# Patient Record
Sex: FEMALE | Race: WHITE | NOT HISPANIC OR LATINO | Employment: FULL TIME | URBAN - METROPOLITAN AREA
[De-identification: names, ages, dates, MRNs, and addresses within clinical notes are randomized per-mention and may not be internally consistent; named-entity substitution may affect disease eponyms.]

---

## 2023-05-07 ENCOUNTER — HOSPITAL ENCOUNTER (EMERGENCY)
Facility: HOSPITAL | Age: 48
Discharge: HOME | End: 2023-05-07
Attending: EMERGENCY MEDICINE | Admitting: EMERGENCY MEDICINE

## 2023-05-07 ENCOUNTER — APPOINTMENT (EMERGENCY)
Dept: RADIOLOGY | Facility: HOSPITAL | Age: 48
End: 2023-05-07

## 2023-05-07 VITALS
RESPIRATION RATE: 21 BRPM | OXYGEN SATURATION: 90 % | TEMPERATURE: 97.2 F | WEIGHT: 293 LBS | SYSTOLIC BLOOD PRESSURE: 137 MMHG | HEIGHT: 66 IN | DIASTOLIC BLOOD PRESSURE: 72 MMHG | HEART RATE: 100 BPM | BODY MASS INDEX: 47.09 KG/M2

## 2023-05-07 DIAGNOSIS — J40 BRONCHITIS: Primary | ICD-10-CM

## 2023-05-07 LAB
ANION GAP SERPL CALCULATED.3IONS-SCNC: 8 MMOL/L (ref 4–13)
ATRIAL RATE: 93 BPM
BASOPHILS # BLD AUTO: 0.05 THOUSANDS/ÂΜL (ref 0–0.1)
BASOPHILS NFR BLD AUTO: 1 % (ref 0–1)
BUN SERPL-MCNC: 8 MG/DL (ref 5–25)
CALCIUM SERPL-MCNC: 9.4 MG/DL (ref 8.4–10.2)
CARDIAC TROPONIN I PNL SERPL HS: 5 NG/L
CHLORIDE SERPL-SCNC: 103 MMOL/L (ref 96–108)
CO2 SERPL-SCNC: 24 MMOL/L (ref 21–32)
CREAT SERPL-MCNC: 0.8 MG/DL (ref 0.6–1.3)
EOSINOPHIL # BLD AUTO: 0.08 THOUSAND/ÂΜL (ref 0–0.61)
EOSINOPHIL NFR BLD AUTO: 1 % (ref 0–6)
ERYTHROCYTE [DISTWIDTH] IN BLOOD BY AUTOMATED COUNT: 15.7 % (ref 11.6–15.1)
GFR SERPL CREATININE-BSD FRML MDRD: 107 ML/MIN/1.73SQ M
GLUCOSE SERPL-MCNC: 95 MG/DL (ref 65–140)
HCT VFR BLD AUTO: 44.1 % (ref 36.5–49.3)
HGB BLD-MCNC: 14.6 G/DL (ref 12–17)
IMM GRANULOCYTES # BLD AUTO: 0.03 THOUSAND/UL (ref 0–0.2)
IMM GRANULOCYTES NFR BLD AUTO: 0 % (ref 0–2)
LYMPHOCYTES # BLD AUTO: 1 THOUSANDS/ÂΜL (ref 0.6–4.47)
LYMPHOCYTES NFR BLD AUTO: 13 % (ref 14–44)
MCH RBC QN AUTO: 29 PG (ref 26.8–34.3)
MCHC RBC AUTO-ENTMCNC: 33.1 G/DL (ref 31.4–37.4)
MCV RBC AUTO: 88 FL (ref 82–98)
MONOCYTES # BLD AUTO: 0.76 THOUSAND/ÂΜL (ref 0.17–1.22)
MONOCYTES NFR BLD AUTO: 10 % (ref 4–12)
NEUTROPHILS # BLD AUTO: 5.81 THOUSANDS/ÂΜL (ref 1.85–7.62)
NEUTS SEG NFR BLD AUTO: 75 % (ref 43–75)
NRBC BLD AUTO-RTO: 0 /100 WBCS
P AXIS: 69 DEGREES
PLATELET # BLD AUTO: 345 THOUSANDS/UL (ref 149–390)
PMV BLD AUTO: 8.9 FL (ref 8.9–12.7)
POTASSIUM SERPL-SCNC: 3.8 MMOL/L (ref 3.5–5.3)
PR INTERVAL: 140 MS
QRS AXIS: 59 DEGREES
QRSD INTERVAL: 76 MS
QT INTERVAL: 348 MS
QTC INTERVAL: 432 MS
RBC # BLD AUTO: 5.03 MILLION/UL (ref 3.88–5.62)
SODIUM SERPL-SCNC: 135 MMOL/L (ref 135–147)
T WAVE AXIS: 36 DEGREES
VENTRICULAR RATE: 93 BPM
WBC # BLD AUTO: 7.73 THOUSAND/UL (ref 4.31–10.16)

## 2023-05-07 RX ORDER — IPRATROPIUM BROMIDE AND ALBUTEROL SULFATE 2.5; .5 MG/3ML; MG/3ML
3 SOLUTION RESPIRATORY (INHALATION) 4 TIMES DAILY
Qty: 60 ML | Refills: 1 | Status: SHIPPED | OUTPATIENT
Start: 2023-05-07

## 2023-05-07 RX ORDER — AZITHROMYCIN 250 MG/1
TABLET, FILM COATED ORAL
Qty: 6 TABLET | Refills: 0 | Status: SHIPPED | OUTPATIENT
Start: 2023-05-07 | End: 2023-05-13

## 2023-05-07 RX ORDER — PREDNISONE 50 MG/1
50 TABLET ORAL DAILY
Qty: 5 TABLET | Refills: 0 | Status: SHIPPED | OUTPATIENT
Start: 2023-05-07 | End: 2023-05-13

## 2023-05-07 RX ORDER — METHYLPREDNISOLONE SODIUM SUCCINATE 125 MG/2ML
125 INJECTION, POWDER, LYOPHILIZED, FOR SOLUTION INTRAMUSCULAR; INTRAVENOUS ONCE
Status: COMPLETED | OUTPATIENT
Start: 2023-05-07 | End: 2023-05-07

## 2023-05-07 RX ORDER — SODIUM CHLORIDE FOR INHALATION 0.9 %
12 VIAL, NEBULIZER (ML) INHALATION ONCE
Status: COMPLETED | OUTPATIENT
Start: 2023-05-07 | End: 2023-05-07

## 2023-05-07 RX ADMIN — IPRATROPIUM BROMIDE 1 MG: 0.5 SOLUTION RESPIRATORY (INHALATION) at 15:10

## 2023-05-07 RX ADMIN — ALBUTEROL SULFATE 10 MG: 2.5 SOLUTION RESPIRATORY (INHALATION) at 15:10

## 2023-05-07 RX ADMIN — METHYLPREDNISOLONE SODIUM SUCCINATE 125 MG: 125 INJECTION, POWDER, FOR SOLUTION INTRAMUSCULAR; INTRAVENOUS at 15:07

## 2023-05-07 RX ADMIN — Medication 12 ML: at 15:10

## 2023-05-07 NOTE — ED PROVIDER NOTES
"History  Chief Complaint   Patient presents with   • Shortness of Breath     Patient \"c/o of sob since yesterday hx of asthma, patient working to breath at arrival to ED, audible wheezing noted, SPO2 @ 90% RA\"     This is a 41y o   year old male  Without significant past medical history other than asthma,   No prior surgeries  Denies the use of cigarettes, alcohol, or drugs  That presents to the Emergency Department today with a chief complaint of SOB, cough, tight in chest since yesterday  Progressive  Constant, severe  Came into the ER via EMS     History obtained directly from the patient  None       No past medical history on file  No past surgical history on file  No family history on file  I have reviewed and agree with the history as documented  No existing history information found  No existing history information found  Review of Systems   Constitutional: Negative for chills  Fever: subjective    HENT: Negative for ear pain and sore throat  Eyes: Negative for pain and visual disturbance  Respiratory: Positive for cough, chest tightness, shortness of breath and wheezing  Cardiovascular: Negative for chest pain and palpitations  Gastrointestinal: Negative for abdominal pain and vomiting  Genitourinary: Negative for dysuria and hematuria  Musculoskeletal: Negative for arthralgias and back pain  Skin: Negative for color change and rash  Neurological: Negative for seizures and syncope  All other systems reviewed and are negative  Physical Exam  Physical Exam  Vitals and nursing note reviewed  Constitutional:       General: He is not in acute distress  Appearance: He is well-developed  He is obese  HENT:      Head: Normocephalic and atraumatic  Mouth/Throat:      Mouth: Mucous membranes are moist    Eyes:      Conjunctiva/sclera: Conjunctivae normal       Pupils: Pupils are equal, round, and reactive to light     Cardiovascular:      Rate and " Rhythm: Normal rate and regular rhythm  Heart sounds: No murmur heard  Pulmonary:      Effort: Pulmonary effort is normal  Tachypnea present  No respiratory distress  Breath sounds: Decreased breath sounds, wheezing and rhonchi present  No rales  Chest:      Chest wall: No deformity or crepitus  Abdominal:      Palpations: Abdomen is soft  Tenderness: There is no abdominal tenderness  Musculoskeletal:         General: No swelling  Cervical back: Neck supple  Right lower leg: No edema  Left lower leg: No edema  Skin:     General: Skin is warm and dry  Capillary Refill: Capillary refill takes less than 2 seconds  Neurological:      General: No focal deficit present  Mental Status: He is alert and oriented to person, place, and time     Psychiatric:         Mood and Affect: Mood normal          Behavior: Behavior normal          Vital Signs  ED Triage Vitals   Temperature Pulse Respirations Blood Pressure SpO2   05/07/23 1452 05/07/23 1452 05/07/23 1452 05/07/23 1452 05/07/23 1452   (!) 97 2 °F (36 2 °C) 100 (!) 24 (!) 178/83 90 %      Temp src Heart Rate Source Patient Position - Orthostatic VS BP Location FiO2 (%)   -- 05/07/23 1452 05/07/23 1615 05/07/23 1452 --    Monitor Sitting Right arm       Pain Score       --                  Vitals:    05/07/23 1452 05/07/23 1615   BP: (!) 178/83 137/72   Pulse: 100 100   Patient Position - Orthostatic VS:  Sitting         Visual Acuity      ED Medications  Medications   albuterol inhalation solution 10 mg (10 mg Nebulization Given 5/7/23 1510)   ipratropium (ATROVENT) 0 02 % inhalation solution 1 mg (1 mg Nebulization Given 5/7/23 1510)   sodium chloride 0 9 % inhalation solution 12 mL (12 mL Nebulization Given 5/7/23 1510)   methylPREDNISolone sodium succinate (Solu-MEDROL) injection 125 mg (125 mg Intravenous Given 5/7/23 1507)       Diagnostic Studies  Results Reviewed     Procedure Component Value Units Date/Time    HS Troponin 0hr (reflex protocol) [[id number]]  (Normal) Collected: 05/07/23 1506    Lab Status: Final result Specimen: Blood from Arm, Right Updated: 05/07/23 1549     hs TnI 0hr 5 ng/L     Basic metabolic panel [[id number]] Collected: 05/07/23 1506    Lab Status: Final result Specimen: Blood from Arm, Right Updated: 05/07/23 1541     Sodium 135 mmol/L      Potassium 3 8 mmol/L      Chloride 103 mmol/L      CO2 24 mmol/L      ANION GAP 8 mmol/L      BUN 8 mg/dL      Creatinine 0 80 mg/dL      Glucose 95 mg/dL      Calcium 9 4 mg/dL      eGFR 107 ml/min/1 73sq m     Narrative:      [place] guidelines for Chronic Kidney Disease (CKD):   •  Stage 1 with normal or high GFR (GFR > 90 mL/min/1 73 square meters)  •  Stage 2 Mild CKD (GFR = 60-89 mL/min/1 73 square meters)  •  Stage 3A Moderate CKD (GFR = 45-59 mL/min/1 73 square meters)  •  Stage 3B Moderate CKD (GFR = 30-44 mL/min/1 73 square meters)  •  Stage 4 Severe CKD (GFR = 15-29 mL/min/1 73 square meters)  •  Stage 5 End Stage CKD (GFR <15 mL/min/1 73 square meters)  Note: GFR calculation is accurate only with a steady state creatinine    CBC and differential [[id number]]  (Abnormal) Collected: 05/07/23 1506    Lab Status: Final result Specimen: Blood from Arm, Right Updated: 05/07/23 1517     WBC 7 73 Thousand/uL      RBC 5 03 Million/uL      Hemoglobin 14 6 g/dL      Hematocrit 44 1 %      MCV 88 fL      MCH 29 0 pg      MCHC 33 1 g/dL      RDW 15 7 %      MPV 8 9 fL      Platelets 058 Thousands/uL      nRBC 0 /100 WBCs      Neutrophils Relative 75 %      Immat GRANS % 0 %      Lymphocytes Relative 13 %      Monocytes Relative 10 %      Eosinophils Relative 1 %      Basophils Relative 1 %      Neutrophils Absolute 5 81 Thousands/µL      Immature Grans Absolute 0 03 Thousand/uL      Lymphocytes Absolute 1 00 Thousands/µL      Monocytes Absolute 0 76 Thousand/µL      Eosinophils Absolute 0 08 Thousand/µL      Basophils Absolute 0 05 "Thousands/µL                  XR chest 1 view portable    (Results Pending)              Procedures  Procedures         ED Course  ED Course as of 05/07/23 2212   Sun May 07, 2023   1524 WBC: 7 73   1524 Hemoglobin: 14 6   1524 HCT: 44 1   1614 hs TnI 0hr: 5   1614 Sodium: 135   1614 Potassium: 3 8   1614 BUN: 8   1614 Creatinine: 0 80                               SBIRT 22yo+    Flowsheet Row Most Recent Value   Initial Alcohol Screen: US AUDIT-C     1  How often do you have a drink containing alcohol? 0 Filed at: 05/07/2023 1600   2  How many drinks containing alcohol do you have on a typical day you are drinking? 0 Filed at: 05/07/2023 1600   3a  Male UNDER 65: How often do you have five or more drinks on one occasion? 0 Filed at: 05/07/2023 1600   3b  FEMALE Any Age, or MALE 65+: How often do you have 4 or more drinks on one occassion? 0 Filed at: 05/07/2023 1600   Audit-C Score 0 Filed at: 05/07/2023 1600   KAREEM: How many times in the past year have you    Used an illegal drug or used a prescription medication for non-medical reasons? Never Filed at: 05/07/2023 1600                    Medical Decision Making  EKG: Done at 1458  Shows normal sinus rhythm rate of 93 bpm   Right atrial enlargement  No ST elevations or depressions  Interpreted by me    Differential diagnosis includes but not limited to: COPD, asthma, pneumonia, bronchitis, CHF, acute MI    Radiology studies have been independently visualized by me  Preliminary interpretation: no pna or effusion, normal heart size   All radiology studies will be officially read by the radiologist and any discrepancies flagged and follow through the discrepancy management process to make the patient aware of significant results  Bronchitis: acute illness or injury     Details: Patient's pulse oximetry right at 91%  Occasionally she will drop to 89    But she says she feels \"100% better \"does not want to stay in the hospital wants to try to go " home   Amount and/or Complexity of Data Reviewed  Labs: ordered  Decision-making details documented in ED Course  Details: wbc normal  HH normal  PLT normal   neg trop  Radiology: ordered  Risk  OTC drugs  Prescription drug management  Disposition  Final diagnoses:   Bronchitis     Time reflects when diagnosis was documented in both MDM as applicable and the Disposition within this note     Time User Action Codes Description Comment    5/7/2023  4:21 PM Antionette Barkley [address] Bronchitis       ED Disposition     ED Disposition   Discharge    Condition   Stable    Date/Time   Sun May 7, 2023  4:22 PM    Comment   Mandeep Cao discharge to home/self care  Follow-up Information    None         Discharge Medication List as of 5/7/2023  4:23 PM      START taking these medications    Details   azithromycin (Zithromax Z-Kingsley) 250 mg tablet Take 2 tablets today then 1 tablet daily x 4 days, Print      ipratropium-albuterol (DUO-NEB) 0 5-2 5 mg/3 mL nebulizer solution Take 3 mL by nebulization 4 (four) times a day, Starting Sun 5/7/2023, Print      predniSONE 50 mg tablet Take 1 tablet (50 mg total) by mouth daily, Starting Sun 5/7/2023, Print             No discharge procedures on file      PDMP Review     None          ED Provider  Electronically Signed by           Adelso Lobo MD  05/07/23 8822

## 2023-05-07 NOTE — DISCHARGE INSTRUCTIONS
A  personal message from Dr Ephraim Mcpherson,  Thank you so much for allowing me to care for you today  I pride myself in the care and attention I give all my patients  I hope you were a witness to this tonight  If for any reason your condition does not improve or worsens, or you have a question that was not answered during your visit you can feel free to text me on my personal phone #  # [phone]  I will answer to your message and continue your care past your emergency room visit  Please understand that although you are being discharged because your condition has been deemed stable and able to be managed on an outpatient setting  However your condition may worsen as part of the natural progression of the illness/condition, if this occurs please come back to the emergency department for a repeat evaluation  Call [employer] at  [phone] Chris [phone]) to obtain a primary care physician  They will be able to schedule you with a physician who sees patients with your insurance and physicians who see patients without insurance

## 2023-05-07 NOTE — Clinical Note
Isaura Roberts was seen and treated in our emergency department on 5/7/2023  Diagnosis: Keshav Recio  may return to work on return date  He may return on this date: 05/09/2023         If you have any questions or concerns, please don't hesitate to call        Tiki Laureano MD    ______________________________           _______________          _______________  Hospital Representative                              Date                                Time

## 2023-05-10 ENCOUNTER — APPOINTMENT (INPATIENT)
Dept: NON INVASIVE DIAGNOSTICS | Facility: HOSPITAL | Age: 48
End: 2023-05-10

## 2023-05-10 ENCOUNTER — HOSPITAL ENCOUNTER (INPATIENT)
Facility: HOSPITAL | Age: 48
LOS: 3 days | Discharge: HOME | End: 2023-05-13
Attending: EMERGENCY MEDICINE | Admitting: INTERNAL MEDICINE

## 2023-05-10 ENCOUNTER — APPOINTMENT (EMERGENCY)
Dept: RADIOLOGY | Facility: HOSPITAL | Age: 48
End: 2023-05-10

## 2023-05-10 ENCOUNTER — APPOINTMENT (EMERGENCY)
Dept: CT IMAGING | Facility: HOSPITAL | Age: 48
End: 2023-05-10

## 2023-05-10 DIAGNOSIS — J45.901 ASTHMA EXACERBATION: ICD-10-CM

## 2023-05-10 DIAGNOSIS — I50.9 ACUTE ON CHRONIC CONGESTIVE HEART FAILURE, UNSPECIFIED HEART FAILURE TYPE (HCC): ICD-10-CM

## 2023-05-10 DIAGNOSIS — F32.A DEPRESSION, UNSPECIFIED DEPRESSION TYPE: ICD-10-CM

## 2023-05-10 DIAGNOSIS — R07.9 CHEST PAIN, UNSPECIFIED TYPE: ICD-10-CM

## 2023-05-10 DIAGNOSIS — J45.909 MILD ASTHMA, UNSPECIFIED WHETHER COMPLICATED, UNSPECIFIED WHETHER PERSISTENT: Primary | ICD-10-CM

## 2023-05-10 DIAGNOSIS — J18.9 PNEUMONIA: ICD-10-CM

## 2023-05-10 DIAGNOSIS — Z72.0 TOBACCO ABUSE: ICD-10-CM

## 2023-05-10 DIAGNOSIS — J96.01 ACUTE RESPIRATORY FAILURE WITH HYPOXEMIA (HCC): ICD-10-CM

## 2023-05-10 DIAGNOSIS — F31.9 BIPOLAR 1 DISORDER (HCC): ICD-10-CM

## 2023-05-10 DIAGNOSIS — J45.909 UNCOMPLICATED ASTHMA, UNSPECIFIED ASTHMA SEVERITY, UNSPECIFIED WHETHER PERSISTENT: ICD-10-CM

## 2023-05-10 DIAGNOSIS — R77.8 ELEVATED TROPONIN: ICD-10-CM

## 2023-05-10 PROBLEM — R56.9 SEIZURE (HCC): Status: ACTIVE | Noted: 2023-05-10

## 2023-05-10 LAB
2HR DELTA HS TROPONIN: 155 NG/L
2HR DELTA HS TROPONIN: 58 NG/L
4HR DELTA HS TROPONIN: -111 NG/L
4HR DELTA HS TROPONIN: 192 NG/L
ALBUMIN SERPL BCP-MCNC: 4.7 G/DL (ref 3.5–5)
ALP SERPL-CCNC: 64 U/L (ref 34–104)
ALT SERPL W P-5'-P-CCNC: 64 U/L (ref 7–52)
ANION GAP SERPL CALCULATED.3IONS-SCNC: 10 MMOL/L (ref 4–13)
AORTIC ROOT: 2.9 CM
APICAL FOUR CHAMBER EJECTION FRACTION: 64 %
APTT PPP: 25 SECONDS (ref 23–37)
APTT PPP: 29 SECONDS (ref 23–37)
APTT PPP: 39 SECONDS (ref 23–37)
ASCENDING AORTA: 2.9 CM
AST SERPL W P-5'-P-CCNC: 60 U/L (ref 13–39)
ATRIAL RATE: 100 BPM
ATRIAL RATE: 100 BPM
ATRIAL RATE: 113 BPM
ATRIAL RATE: 99 BPM
BASE EX.OXY STD BLDV CALC-SCNC: 65.9 % (ref 60–80)
BASE EXCESS BLDV CALC-SCNC: -1.2 MMOL/L
BASOPHILS # BLD AUTO: 0.04 THOUSANDS/ÂΜL (ref 0–0.1)
BASOPHILS NFR BLD AUTO: 0 % (ref 0–1)
BILIRUB SERPL-MCNC: 0.38 MG/DL (ref 0.2–1)
BNP SERPL-MCNC: 127 PG/ML (ref 0–100)
BUN SERPL-MCNC: 15 MG/DL (ref 5–25)
CALCIUM SERPL-MCNC: 9.4 MG/DL (ref 8.4–10.2)
CARDIAC TROPONIN I PNL SERPL HS: 415 NG/L
CARDIAC TROPONIN I PNL SERPL HS: 570 NG/L
CARDIAC TROPONIN I PNL SERPL HS: 603 NG/L
CARDIAC TROPONIN I PNL SERPL HS: 607 NG/L
CARDIAC TROPONIN I PNL SERPL HS: 714 NG/L
CARDIAC TROPONIN I PNL SERPL HS: 732 NG/L (ref 8–18)
CARDIAC TROPONIN I PNL SERPL HS: 772 NG/L
CHLORIDE SERPL-SCNC: 101 MMOL/L (ref 96–108)
CO2 SERPL-SCNC: 25 MMOL/L (ref 21–32)
CREAT SERPL-MCNC: 0.91 MG/DL (ref 0.6–1.3)
E WAVE DECELERATION TIME: 259 MS
EOSINOPHIL # BLD AUTO: 0.02 THOUSAND/ÂΜL (ref 0–0.61)
EOSINOPHIL NFR BLD AUTO: 0 % (ref 0–6)
ERYTHROCYTE [DISTWIDTH] IN BLOOD BY AUTOMATED COUNT: 15.6 % (ref 11.6–15.1)
FRACTIONAL SHORTENING: 26 % (ref 28–44)
GFR SERPL CREATININE-BSD FRML MDRD: 101 ML/MIN/1.73SQ M
GLUCOSE SERPL-MCNC: 121 MG/DL (ref 65–140)
HCO3 BLDV-SCNC: 25.3 MMOL/L (ref 24–30)
HCT VFR BLD AUTO: 45 % (ref 36.5–49.3)
HGB BLD-MCNC: 14.8 G/DL (ref 12–17)
IMM GRANULOCYTES # BLD AUTO: 0.04 THOUSAND/UL (ref 0–0.2)
IMM GRANULOCYTES NFR BLD AUTO: 0 % (ref 0–2)
INR PPP: 1.03 (ref 0.84–1.19)
INTERVENTRICULAR SEPTUM IN DIASTOLE (PARASTERNAL SHORT AXIS VIEW): 1 CM
INTERVENTRICULAR SEPTUM: 1 CM (ref 0.6–1.1)
LAAS-AP2: 20 CM2
LAAS-AP4: 21.3 CM2
LACTATE SERPL-SCNC: 1.8 MMOL/L (ref 0.5–2)
LACTATE SERPL-SCNC: 2.8 MMOL/L (ref 0.5–2)
LEFT ATRIUM SIZE: 3.8 CM
LEFT INTERNAL DIMENSION IN SYSTOLE: 3.9 CM (ref 2.1–4)
LEFT VENTRICLE DIASTOLIC VOLUME (MOD BIPLANE): 95 ML
LEFT VENTRICLE SYSTOLIC VOLUME (MOD BIPLANE): 33 ML
LEFT VENTRICULAR INTERNAL DIMENSION IN DIASTOLE: 5.3 CM (ref 3.5–6)
LEFT VENTRICULAR POSTERIOR WALL IN END DIASTOLE: 1 CM
LEFT VENTRICULAR STROKE VOLUME: 72 ML
LV EF: 65 %
LVSV (TEICH): 72 ML
LYMPHOCYTES # BLD AUTO: 1.13 THOUSANDS/ÂΜL (ref 0.6–4.47)
LYMPHOCYTES NFR BLD AUTO: 11 % (ref 14–44)
MCH RBC QN AUTO: 28.8 PG (ref 26.8–34.3)
MCHC RBC AUTO-ENTMCNC: 32.9 G/DL (ref 31.4–37.4)
MCV RBC AUTO: 88 FL (ref 82–98)
MONOCYTES # BLD AUTO: 0.99 THOUSAND/ÂΜL (ref 0.17–1.22)
MONOCYTES NFR BLD AUTO: 10 % (ref 4–12)
MV E'TISSUE VEL-SEP: 9 CM/S
MV EROA: 0 CM2
MV MEAN GRADIENT: 6.2 MMHG
MV PEAK A VEL: 0.99 M/S
MV PEAK E VEL: 157 CM/S
MV STENOSIS PRESSURE HALF TIME: 75 MS
MV VALVE AREA P 1/2 METHOD: 2.93 CM2
NEUTROPHILS # BLD AUTO: 8.02 THOUSANDS/ÂΜL (ref 1.85–7.62)
NEUTS SEG NFR BLD AUTO: 79 % (ref 43–75)
NRBC BLD AUTO-RTO: 0 /100 WBCS
O2 CT BLDV-SCNC: 14.9 ML/DL
P AXIS: 82 DEGREES
P AXIS: 87 DEGREES
PCO2 BLDV: 48.6 MM HG (ref 42–50)
PH BLDV: 7.33 [PH] (ref 7.3–7.4)
PISA MRMAX VEL: 0.36 M/S
PISA RADIUS: 0.3 CM
PLATELET # BLD AUTO: 368 THOUSANDS/UL (ref 149–390)
PMV BLD AUTO: 8.5 FL (ref 8.9–12.7)
PO2 BLDV: 37.7 MM HG (ref 35–45)
POTASSIUM SERPL-SCNC: 3.5 MMOL/L (ref 3.5–5.3)
PR INTERVAL: 124 MS
PR INTERVAL: 136 MS
PROT SERPL-MCNC: 7.6 G/DL (ref 6.4–8.4)
PROTHROMBIN TIME: 13.3 SECONDS (ref 11.6–14.5)
QRS AXIS: 55 DEGREES
QRS AXIS: 61 DEGREES
QRS AXIS: 63 DEGREES
QRS AXIS: 67 DEGREES
QRSD INTERVAL: 76 MS
QRSD INTERVAL: 78 MS
QRSD INTERVAL: 80 MS
QRSD INTERVAL: 86 MS
QT INTERVAL: 346 MS
QT INTERVAL: 352 MS
QTC INTERVAL: 446 MS
QTC INTERVAL: 446 MS
QTC INTERVAL: 451 MS
QTC INTERVAL: 474 MS
RBC # BLD AUTO: 5.13 MILLION/UL (ref 3.88–5.62)
RIGHT ATRIUM AREA SYSTOLE A4C: 20.6 CM2
RIGHT VENTRICLE ID DIMENSION: 3.7 CM
SL CV LEFT ATRIUM LENGTH A2C: 5.7 CM
SL CV LV EF: 60
SL CV PED ECHO LEFT VENTRICLE DIASTOLIC VOLUME (MOD BIPLANE) 2D: 136 ML
SL CV PED ECHO LEFT VENTRICLE SYSTOLIC VOLUME (MOD BIPLANE) 2D: 64 ML
SODIUM SERPL-SCNC: 136 MMOL/L (ref 135–147)
T WAVE AXIS: 55 DEGREES
T WAVE AXIS: 77 DEGREES
T WAVE AXIS: 79 DEGREES
T WAVE AXIS: 80 DEGREES
TRICUSPID ANNULAR PLANE SYSTOLIC EXCURSION: 2.4 CM
VENTRICULAR RATE: 100 BPM
VENTRICULAR RATE: 100 BPM
VENTRICULAR RATE: 113 BPM
VENTRICULAR RATE: 99 BPM
WBC # BLD AUTO: 10.24 THOUSAND/UL (ref 4.31–10.16)

## 2023-05-10 PROCEDURE — B211YZZ FLUOROSCOPY OF MULTIPLE CORONARY ARTERIES USING OTHER CONTRAST: ICD-10-PCS | Performed by: INTERNAL MEDICINE

## 2023-05-10 PROCEDURE — 4A023N7 MEASUREMENT OF CARDIAC SAMPLING AND PRESSURE, LEFT HEART, PERCUTANEOUS APPROACH: ICD-10-PCS | Performed by: INTERNAL MEDICINE

## 2023-05-10 RX ORDER — IPRATROPIUM BROMIDE AND ALBUTEROL SULFATE 2.5; .5 MG/3ML; MG/3ML
3 SOLUTION RESPIRATORY (INHALATION) ONCE AS NEEDED
Status: COMPLETED | OUTPATIENT
Start: 2023-05-10 | End: 2023-05-10

## 2023-05-10 RX ORDER — HEPARIN SODIUM 1000 [USP'U]/ML
2000 INJECTION, SOLUTION INTRAVENOUS; SUBCUTANEOUS EVERY 6 HOURS PRN
Status: DISCONTINUED | OUTPATIENT
Start: 2023-05-10 | End: 2023-05-11

## 2023-05-10 RX ORDER — MAGNESIUM HYDROXIDE/ALUMINUM HYDROXICE/SIMETHICONE 120; 1200; 1200 MG/30ML; MG/30ML; MG/30ML
30 SUSPENSION ORAL EVERY 4 HOURS PRN
Status: DISCONTINUED | OUTPATIENT
Start: 2023-05-10 | End: 2023-05-13 | Stop reason: HOSPADM

## 2023-05-10 RX ORDER — ASPIRIN 81 MG/1
324 TABLET, CHEWABLE ORAL ONCE
Status: COMPLETED | OUTPATIENT
Start: 2023-05-10 | End: 2023-05-10

## 2023-05-10 RX ORDER — HEPARIN SODIUM 10000 [USP'U]/100ML
3-20 INJECTION, SOLUTION INTRAVENOUS
Status: DISCONTINUED | OUTPATIENT
Start: 2023-05-10 | End: 2023-05-11

## 2023-05-10 RX ORDER — LEVALBUTEROL INHALATION SOLUTION 1.25 MG/3ML
1.25 SOLUTION RESPIRATORY (INHALATION)
Status: DISCONTINUED | OUTPATIENT
Start: 2023-05-10 | End: 2023-05-13 | Stop reason: HOSPADM

## 2023-05-10 RX ORDER — LEVALBUTEROL INHALATION SOLUTION 1.25 MG/3ML
1.25 SOLUTION RESPIRATORY (INHALATION) EVERY 8 HOURS PRN
Status: DISCONTINUED | OUTPATIENT
Start: 2023-05-10 | End: 2023-05-13 | Stop reason: HOSPADM

## 2023-05-10 RX ORDER — HYDROXYZINE HYDROCHLORIDE 25 MG/1
25 TABLET, FILM COATED ORAL EVERY 6 HOURS PRN
COMMUNITY
End: 2023-05-13

## 2023-05-10 RX ORDER — MAGNESIUM SULFATE HEPTAHYDRATE 40 MG/ML
2 INJECTION, SOLUTION INTRAVENOUS ONCE
Status: COMPLETED | OUTPATIENT
Start: 2023-05-10 | End: 2023-05-10

## 2023-05-10 RX ORDER — ACETAMINOPHEN 325 MG/1
650 TABLET ORAL EVERY 6 HOURS PRN
Status: DISCONTINUED | OUTPATIENT
Start: 2023-05-10 | End: 2023-05-13 | Stop reason: HOSPADM

## 2023-05-10 RX ORDER — HEPARIN SODIUM 1000 [USP'U]/ML
4000 INJECTION, SOLUTION INTRAVENOUS; SUBCUTANEOUS EVERY 6 HOURS PRN
Status: DISCONTINUED | OUTPATIENT
Start: 2023-05-10 | End: 2023-05-11

## 2023-05-10 RX ORDER — ASPIRIN 81 MG/1
81 TABLET, CHEWABLE ORAL DAILY
Status: DISCONTINUED | OUTPATIENT
Start: 2023-05-11 | End: 2023-05-13 | Stop reason: HOSPADM

## 2023-05-10 RX ORDER — FUROSEMIDE 10 MG/ML
40 INJECTION INTRAMUSCULAR; INTRAVENOUS DAILY
Status: DISCONTINUED | OUTPATIENT
Start: 2023-05-10 | End: 2023-05-10

## 2023-05-10 RX ORDER — FUROSEMIDE 10 MG/ML
40 INJECTION INTRAMUSCULAR; INTRAVENOUS
Status: DISCONTINUED | OUTPATIENT
Start: 2023-05-10 | End: 2023-05-12

## 2023-05-10 RX ORDER — MIRTAZAPINE 15 MG/1
15 TABLET, FILM COATED ORAL
Status: DISCONTINUED | OUTPATIENT
Start: 2023-05-10 | End: 2023-05-13 | Stop reason: HOSPADM

## 2023-05-10 RX ORDER — DIAZEPAM 5 MG/1
10 TABLET ORAL
Status: DISCONTINUED | OUTPATIENT
Start: 2023-05-10 | End: 2023-05-13 | Stop reason: HOSPADM

## 2023-05-10 RX ORDER — METHYLPREDNISOLONE SODIUM SUCCINATE 125 MG/2ML
80 INJECTION, POWDER, LYOPHILIZED, FOR SOLUTION INTRAMUSCULAR; INTRAVENOUS ONCE
Status: COMPLETED | OUTPATIENT
Start: 2023-05-10 | End: 2023-05-10

## 2023-05-10 RX ORDER — IPRATROPIUM BROMIDE AND ALBUTEROL SULFATE 2.5; .5 MG/3ML; MG/3ML
3 SOLUTION RESPIRATORY (INHALATION) ONCE
Status: COMPLETED | OUTPATIENT
Start: 2023-05-10 | End: 2023-05-10

## 2023-05-10 RX ORDER — CARBAMAZEPINE 400 MG/1
400 TABLET, EXTENDED RELEASE ORAL 2 TIMES DAILY
COMMUNITY

## 2023-05-10 RX ORDER — HEPARIN SODIUM 1000 [USP'U]/ML
4000 INJECTION, SOLUTION INTRAVENOUS; SUBCUTANEOUS ONCE
Status: COMPLETED | OUTPATIENT
Start: 2023-05-10 | End: 2023-05-10

## 2023-05-10 RX ORDER — METHYLPREDNISOLONE SODIUM SUCCINATE 40 MG/ML
40 INJECTION, POWDER, LYOPHILIZED, FOR SOLUTION INTRAMUSCULAR; INTRAVENOUS EVERY 8 HOURS SCHEDULED
Status: DISCONTINUED | OUTPATIENT
Start: 2023-05-10 | End: 2023-05-13

## 2023-05-10 RX ORDER — CALCIUM CARBONATE 200(500)MG
500 TABLET,CHEWABLE ORAL DAILY PRN
Status: DISCONTINUED | OUTPATIENT
Start: 2023-05-10 | End: 2023-05-10

## 2023-05-10 RX ORDER — LEVETIRACETAM 500 MG/1
500 TABLET ORAL EVERY 12 HOURS SCHEDULED
Status: DISCONTINUED | OUTPATIENT
Start: 2023-05-10 | End: 2023-05-13 | Stop reason: HOSPADM

## 2023-05-10 RX ORDER — MIRTAZAPINE 15 MG/1
15 TABLET, FILM COATED ORAL
COMMUNITY

## 2023-05-10 RX ORDER — CARBAMAZEPINE 100 MG/1
400 TABLET, EXTENDED RELEASE ORAL 2 TIMES DAILY
Status: DISCONTINUED | OUTPATIENT
Start: 2023-05-10 | End: 2023-05-13 | Stop reason: HOSPADM

## 2023-05-10 RX ORDER — NICOTINE 21 MG/24HR
1 PATCH, TRANSDERMAL 24 HOURS TRANSDERMAL DAILY
Status: DISCONTINUED | OUTPATIENT
Start: 2023-05-10 | End: 2023-05-13 | Stop reason: HOSPADM

## 2023-05-10 RX ORDER — LEVETIRACETAM 500 MG/1
500 TABLET ORAL EVERY 12 HOURS SCHEDULED
COMMUNITY

## 2023-05-10 RX ADMIN — METHYLPREDNISOLONE SODIUM SUCCINATE 80 MG: 125 INJECTION, POWDER, FOR SOLUTION INTRAMUSCULAR; INTRAVENOUS at 04:55

## 2023-05-10 RX ADMIN — HEPARIN SODIUM 4000 UNITS: 1000 INJECTION INTRAVENOUS; SUBCUTANEOUS at 15:49

## 2023-05-10 RX ADMIN — FUROSEMIDE 40 MG: 10 INJECTION, SOLUTION INTRAVENOUS at 17:52

## 2023-05-10 RX ADMIN — SODIUM CHLORIDE 1000 ML: 0.9 INJECTION, SOLUTION INTRAVENOUS at 07:51

## 2023-05-10 RX ADMIN — ACETAMINOPHEN 650 MG: 325 TABLET ORAL at 14:04

## 2023-05-10 RX ADMIN — ASPIRIN 324 MG: 81 TABLET, CHEWABLE ORAL at 07:50

## 2023-05-10 RX ADMIN — MIRTAZAPINE 15 MG: 15 TABLET, FILM COATED ORAL at 21:24

## 2023-05-10 RX ADMIN — HEPARIN SODIUM 4000 UNITS: 1000 INJECTION INTRAVENOUS; SUBCUTANEOUS at 23:24

## 2023-05-10 RX ADMIN — CALCIUM CARBONATE (ANTACID) CHEW TAB 500 MG 500 MG: 500 CHEW TAB at 09:16

## 2023-05-10 RX ADMIN — IPRATROPIUM BROMIDE 0.5 MG: 0.5 SOLUTION RESPIRATORY (INHALATION) at 19:35

## 2023-05-10 RX ADMIN — CARBAMAZEPINE 400 MG: 100 TABLET, EXTENDED RELEASE ORAL at 10:05

## 2023-05-10 RX ADMIN — MAGNESIUM SULFATE HEPTAHYDRATE 2 G: 40 INJECTION, SOLUTION INTRAVENOUS at 04:59

## 2023-05-10 RX ADMIN — HEPARIN SODIUM 11.1 UNITS/KG/HR: 10000 INJECTION, SOLUTION INTRAVENOUS at 09:12

## 2023-05-10 RX ADMIN — LEVALBUTEROL HYDROCHLORIDE 1.25 MG: 1.25 SOLUTION RESPIRATORY (INHALATION) at 14:06

## 2023-05-10 RX ADMIN — HEPARIN SODIUM 4000 UNITS: 1000 INJECTION INTRAVENOUS; SUBCUTANEOUS at 08:53

## 2023-05-10 RX ADMIN — IPRATROPIUM BROMIDE AND ALBUTEROL SULFATE 3 ML: .5; 3 SOLUTION RESPIRATORY (INHALATION) at 07:32

## 2023-05-10 RX ADMIN — METHYLPREDNISOLONE SODIUM SUCCINATE 40 MG: 40 INJECTION, POWDER, FOR SOLUTION INTRAMUSCULAR; INTRAVENOUS at 13:18

## 2023-05-10 RX ADMIN — LEVALBUTEROL HYDROCHLORIDE 1.25 MG: 1.25 SOLUTION RESPIRATORY (INHALATION) at 19:34

## 2023-05-10 RX ADMIN — ALUMINA, MAGNESIA, AND SIMETHICONE ORAL SUSPENSION REGULAR STRENGTH 30 ML: 1200; 1200; 120 SUSPENSION ORAL at 15:33

## 2023-05-10 RX ADMIN — METHYLPREDNISOLONE SODIUM SUCCINATE 40 MG: 40 INJECTION, POWDER, FOR SOLUTION INTRAMUSCULAR; INTRAVENOUS at 21:25

## 2023-05-10 RX ADMIN — CEFTRIAXONE SODIUM 2000 MG: 10 INJECTION, POWDER, FOR SOLUTION INTRAVENOUS at 07:50

## 2023-05-10 RX ADMIN — IPRATROPIUM BROMIDE AND ALBUTEROL SULFATE 3 ML: 2.5; .5 SOLUTION RESPIRATORY (INHALATION) at 06:42

## 2023-05-10 RX ADMIN — LEVETIRACETAM 500 MG: 500 TABLET, FILM COATED ORAL at 09:15

## 2023-05-10 RX ADMIN — IOHEXOL 100 ML: 350 INJECTION, SOLUTION INTRAVENOUS at 06:26

## 2023-05-10 RX ADMIN — DIAZEPAM 10 MG: 5 TABLET ORAL at 21:25

## 2023-05-10 RX ADMIN — IPRATROPIUM BROMIDE 0.5 MG: 0.5 SOLUTION RESPIRATORY (INHALATION) at 14:06

## 2023-05-10 RX ADMIN — IPRATROPIUM BROMIDE AND ALBUTEROL SULFATE 3 ML: .5; 3 SOLUTION RESPIRATORY (INHALATION) at 04:45

## 2023-05-10 RX ADMIN — LEVETIRACETAM 500 MG: 500 TABLET, FILM COATED ORAL at 21:24

## 2023-05-10 RX ADMIN — CARBAMAZEPINE 400 MG: 100 TABLET, EXTENDED RELEASE ORAL at 17:52

## 2023-05-10 NOTE — RESPIRATORY THERAPY NOTE
RT Protocol Note  Jacques Liu 41 y o  male MRN: [id number]  Unit/Bed#: ED 09 Encounter: [id number]    Assessment    Principal Problem:    Asthma  Active Problems:    Seizure ([facility] [address] )    Acute respiratory failure (HCC)    HTN (hypertension)    Bipolar 1 disorder (HCC)    Elevated troponin    Chest pain      Home Pulmonary Medications:  Inhaler/neb       Past Medical History:   Diagnosis Date    Asthma     Seizure ([facility] )      Social History     Socioeconomic History    Marital status: /Civil Union     Spouse name: None    Number of children: None    Years of education: None    Highest education level: None   Occupational History    None   Tobacco Use    Smoking status: Every Day     Types: Cigarettes    Smokeless tobacco: Never   Vaping Use    Vaping Use: Every day   Substance and Sexual Activity    Alcohol use: None    Drug use: Never    Sexual activity: None   Other Topics Concern    None   Social History Narrative    None     Social Determinants of Health     Financial Resource Strain: Not on file   Food Insecurity: Not on file   Transportation Needs: Not on file   Physical Activity: Not on file   Stress: Not on file   Social Connections: Not on file   Intimate Partner Violence: Not on file   Housing Stability: Not on file       Subjective         Objective    Physical Exam:   Assessment Type: (P) Assess only  General Appearance: (P) Alert, Awake  Respiratory Pattern: (P) Tachypneic  Chest Assessment: (P) Chest expansion symmetrical  Bilateral Breath Sounds: (P) Diminished, Expiratory wheezes, Scattered, Rales  Cough: (P) Congested, Non-productive  O2 Device: (P) NC    Vitals:  Blood pressure 140/84, pulse (P) 81, temperature 98 6 °F (37 °C), temperature source Oral, resp  rate (P) 22, SpO2 (P) 92 %  Imaging and other studies: I have personally reviewed pertinent reports        O2 Device: (P) NC     Plan    Respiratory Plan: (P) Mild Distress pathway        Resp Comments: (P) Protocol completed at this time  Pt w/ PMH of asthma  Has not had to use inhaler until recently  Was here on Sunday for same and sent home w/ rescue inhalers and nebs  Pt didn't feel any relief w/ either, which prompted her to call 911  Will order Xop/Atro TID to help improve resp status

## 2023-05-10 NOTE — ED PROVIDER NOTES
"History  Chief Complaint   Patient presents with   • Asthma     Pt arrives via EMS c/o an asthma attack starting tonight  Pt recently diagnosed w/ bronchitis  Pt reports sudden onset of wheezing/ SOB  Used rescue inhaler w/ no relief  Given 2 duo nebs from EMS  HPI  41 y o  male with a PMH of asthma presents with a chief complaints of \"I had an asthma attack  \"    Patient conversant upon initial evaluation, able to provide information regarding history  Patient placed on nasal cannula oxygen by EMS, 87% upon arrival to the emergency room, patient placed back on nasal cannula oxygen with appropriate response  Patient affirms several days of progressive dyspnea that started gradually and acutely worsened tonight  Patient states exertion worsens the dyspnea and albuterol modestly improved the dyspnea  Patient was seen in the emergency room on 5/7 and treated for bronchitis with corticosteroids, albuterol, and azithromycin  Patient states she has been compliant with the medications and used her inhaler    Patient affirms cough  Patient denies chest pain but affirms chest \"tightness  \"  Patient denies acute leg pain or swelling  Patient denies any significant acute weight gain  Patient affirms chills but did not check her temperature  Patient afebrile in the emergency room including at prior visit  Patient denies hematochezia or melanotic stools, nausea/vomiting, new rashes, diaphoresis, palpitations, weakness, dizziness, syncope, focal weakness or paresthesia  All other review of systems reviewed and noted to be negative  Patient affirms a history of pulmonary disease  Patient does not take medications daily, only uses albuterol as needed  Patient denies a history of atherosclerotic disease (CAD/TIA/CVA/PAD)  Patient denies any history of prior cardiopulmonary surgery  Patient affirms any use of tobacco in the past 90 days  Patient counseled in smoking cessation      Patient denies " any immobilization of at least 3 days or surgery in the past 4 weeks  Patient denies any history of DVT or PE  Patient denies any history or family history of thrombophilia  Patient denies any malignancy with treatment within the past 6 months  Patient denies any use of exogenous estrogen containing compounds  Patient denies any extended travel greater than 10 hours [facility])  Patient denies pregnancy or recent (less than 6 weeks) pregnancy  Focused Objective  PHYSICAL EXAM:  Constitutional:  No acute distress  Neck:  No asymmetry without obvious masses or swelling; no tracheal deviation  No jugular venous distention  No stridor  No bruit  CV: Tachycardic rate and regular rhythm  No S3  No murmur  Peripheral pulses intact and equal   Respiratory:  Normal inspection with no rash, signs of infection, or trauma  No intercostal, supraclavicular, subcostal, or substernal retractions  No tenderness or crepitus on palpitation  Auscultation demonstrates diffuse wheezing with adequate air movement  Medical Decision Making  Patient presents with dyspnea representing a broad differential, including multiple emergent diagnoses  Given the large differential, the decision making in this case is of high complexity  Patient presenting afebrile though she does note a history of chills  Patient recently diagnosed with bronchitis and placed on azithromycin without improvement  Could represent upper infectious etiology as source of patient's onset of symptoms but unlikely to be the main etiology considering patient's history of asthma with active smoking  Patient has a past medical history suggestive of asthma exacerbation  EKG obtained and reviewed independently by myself, which was interpreted by myself as sinus tachycardia rate of 113  There are no acute ST segment changes  Patient placed on cardiac monitoring      CXR will be obtained to evaluate for alternative pathologies, including pneumothorax, pulmonary edema, or pneumonia  Laboratory analysis including CBC to evaluate for anemia and evaluate potential for significant leukocytosis  Will obtain BMP to evaluate renal function and electrolytes including possibility of metabolic derangement accounting for patient's symptoms  Considering patient's history, will obtain troponin to evaluate for ACS  Will obtain VBG to further evaluate possibility of metabolic derangement as a component of the patient's symptoms and evaluate the chronicity of the possible symptamatology  Considering patient's history and examination, will attempt symptomatic management with additional nebulized medications, methylprednisolone as she has failed treatment with prednisone, and magnesium  Patient will be placed on continuous cardiopulmonary monitoring and reassessed  Prior to Admission Medications   Prescriptions Last Dose Informant Patient Reported? Taking?    azithromycin (Zithromax Z-Kingsley) 250 mg tablet 5/9/2023  No Yes   Sig: Take 2 tablets today then 1 tablet daily x 4 days   carBAMazepine (TEGretol XR) 400 mg 12 hr tablet 5/10/2023  Yes Yes   Sig: Take 400 mg by mouth 2 (two) times a day   cariprazine (VRAYLAR) 4 5 MG capsule Past Week  Yes Yes   Sig: Take 4 5 mg by mouth daily   hydrOXYzine HCL (ATARAX) 25 mg tablet 5/9/2023  Yes Yes   Sig: Take 25 mg by mouth every 6 (six) hours as needed for anxiety   ipratropium-albuterol (DUO-NEB) 0 5-2 5 mg/3 mL nebulizer solution 5/9/2023  No Yes   Sig: Take 3 mL by nebulization 4 (four) times a day   levETIRAcetam (KEPPRA) 500 mg tablet 5/10/2023  Yes Yes   Sig: Take 500 mg by mouth every 12 (twelve) hours   mirtazapine (REMERON) 15 mg tablet 5/9/2023  Yes Yes   Sig: Take 15 mg by mouth daily at bedtime   predniSONE 50 mg tablet 5/9/2023  No Yes   Sig: Take 1 tablet (50 mg total) by mouth daily      Facility-Administered Medications: None       Past Medical History:   Diagnosis Date   • Asthma    • Seizure [facility])        Past Surgical History:   Procedure Laterality Date   • CARDIAC CATHETERIZATION N/A 5/11/2023    Procedure: Cardiac catheterization;  Surgeon: David Pack MD;  Location: [address] [place] [address] CATH LAB; Service: Cardiology   • CARDIAC CATHETERIZATION N/A 5/11/2023    Procedure: Cardiac Coronary Angiogram;  Surgeon: David Pack MD;  Location: [address] [place] [address] CATH LAB; Service: Cardiology       History reviewed  No pertinent family history  I have reviewed and agree with the history as documented      E-Cigarette/Vaping   • E-Cigarette Use Current Every Day User      E-Cigarette/Vaping Substances     Social History     Tobacco Use   • Smoking status: Every Day     Types: Cigarettes   • Smokeless tobacco: Never   Vaping Use   • Vaping Use: Every day   Substance Use Topics   • Alcohol use: Never   • Drug use: Never       Review of Systems    Physical Exam  Physical Exam    Vital Signs  ED Triage Vitals [05/10/23 0415]   Temperature Pulse Respirations Blood Pressure SpO2   98 3 °F (36 8 °C) (!) 117 (!) 24 137/89 (!) 87 %      Temp Source Heart Rate Source Patient Position - Orthostatic VS BP Location FiO2 (%)   Oral Monitor Sitting Right arm --      Pain Score       No Pain           Vitals:    05/13/23 0620 05/13/23 0622 05/13/23 0720 05/13/23 1453   BP:   142/89 143/81   Pulse: 55 59 63    Patient Position - Orthostatic VS:             Visual Acuity      ED Medications  Medications   sodium chloride 0 9 % infusion (0 mL/hr Intravenous Stopped 5/11/23 7968)   ipratropium-albuterol (DUO-NEB) 0 5-2 5 mg/3 mL inhalation solution 3 mL (3 mL Nebulization Given 5/10/23 3555)   methylPREDNISolone sodium succinate (Solu-MEDROL) injection 80 mg (80 mg Intravenous Given 5/10/23 0755)   ipratropium-albuterol (DUO-NEB) 0 5-2 5 mg/3 mL inhalation solution 3 mL (3 mL Nebulization Given 5/10/23 0642)   magnesium sulfate 2 g/50 mL IVPB (premix) 2 g (0 g Intravenous Stopped 5/10/23 1584)   iohexol (OMNIPAQUE) 350 MG/ML injection (SINGLE-DOSE) 100 mL (100 mL Intravenous Given 5/10/23 0626)   ceftriaxone (ROCEPHIN) 2 g/50 mL in dextrose IVPB (0 mg Intravenous Stopped 5/10/23 1124)   sodium chloride 0 9 % bolus 1,000 mL (0 mL Intravenous Stopped 5/10/23 1124)   ipratropium-albuterol (DUO-NEB) 0 5-2 5 mg/3 mL inhalation solution 3 mL (3 mL Nebulization Given 5/10/23 0732)   aspirin chewable tablet 324 mg (324 mg Oral Given 5/10/23 0750)   heparin (porcine) injection 4,000 Units (4,000 Units Intravenous Given 5/10/23 0853)   hydrALAZINE (APRESOLINE) injection 5 mg (5 mg Intravenous Given 5/11/23 0031)       Diagnostic Studies  Results Reviewed     Procedure Component Value Units Date/Time    Blood culture #1 [[id number]] Collected: 05/10/23 0737    Lab Status: Final result Specimen: Blood from Arm, Right Updated: 05/15/23 1201     Blood Culture No Growth After 5 Days  Blood culture #2 [[id number]] Collected: 05/10/23 0737    Lab Status: Final result Specimen: Blood from Arm, Left Updated: 05/15/23 1201     Blood Culture No Growth After 5 Days      Basic metabolic panel [[id number]] Collected: 05/11/23 0454    Lab Status: Final result Specimen: Blood from Arm, Right Updated: 05/11/23 0556     Sodium 137 mmol/L      Potassium 3 9 mmol/L      Chloride 103 mmol/L      CO2 27 mmol/L      ANION GAP 7 mmol/L      BUN 12 mg/dL      Creatinine 0 72 mg/dL      Glucose 118 mg/dL      Calcium 9 1 mg/dL      eGFR 112 ml/min/1 73sq m     Narrative:      [facility] guidelines for Chronic Kidney Disease (CKD):   •  Stage 1 with normal or high GFR (GFR > 90 mL/min/1 73 square meters)  •  Stage 2 Mild CKD (GFR = 60-89 mL/min/1 73 square meters)  •  Stage 3A Moderate CKD (GFR = 45-59 mL/min/1 73 square meters)  •  Stage 3B Moderate CKD (GFR = 30-44 mL/min/1 73 square meters)  •  Stage 4 Severe CKD (GFR = 15-29 mL/min/1 73 square meters)  •  Stage 5 End Stage CKD (GFR <15 mL/min/1 73 square meters)  Note: GFR calculation is accurate only with a steady state creatinine    CBC (With Platelets) [[id number]]  (Abnormal) Collected: 05/11/23 0454    Lab Status: Final result Specimen: Blood from Arm, Right Updated: 05/11/23 0526     WBC 9 54 Thousand/uL      RBC 5 03 Million/uL      Hemoglobin 14 3 g/dL      Hematocrit 44 0 %      MCV 88 fL      MCH 28 4 pg      MCHC 32 5 g/dL      RDW 15 8 %      Platelets 521 Thousands/uL      MPV 8 8 fL     APTT [[id number]]  (Abnormal) Collected: 05/10/23 2211    Lab Status: Final result Specimen: Blood from Hand, Left Updated: 05/10/23 2301     PTT 39 seconds     HS Troponin I 4hr [[id number]]  (Abnormal) Collected: 05/10/23 1932    Lab Status: Final result Specimen: Blood from Hand, Left Updated: 05/10/23 2008     hs TnI 4hr 603 ng/L      Delta 4hr hsTnI -111 ng/L     HS Troponin I 2hr [[id number]]  (Abnormal) Collected: 05/10/23 1711    Lab Status: Final result Specimen: Blood from Hand, Right Updated: 05/10/23 1752     hs TnI 2hr 772 ng/L      Delta 2hr hsTnI 58 ng/L     APTT six (6) hours after Heparin bolus/drip initiation or dosing change [[id number]]  (Normal) Collected: 05/10/23 1526    Lab Status: Final result Specimen: Blood from Arm, Left Updated: 05/10/23 1545     PTT 29 seconds     HS Troponin 0hr (reflex protocol) [[id number]]  (Abnormal) Collected: 05/10/23 1418    Lab Status: Final result Specimen: Blood from Hand, Left Updated: 05/10/23 1500     hs TnI 0hr 714 ng/L     High Sensitivity Troponin I Random [[id number]]  (Abnormal) Collected: 05/10/23 1128    Lab Status: Final result Specimen: Blood from Hand, Left Updated: 05/10/23 1206     HS TnI random 732 ng/L     HS Troponin I 4hr [[id number]]  (Abnormal) Collected: 05/10/23 0912    Lab Status: Final result Specimen: Blood from Arm, Left Updated: 05/10/23 0953     hs TnI 4hr 607 ng/L      Delta 4hr hsTnI 192 ng/L     B-Type Natriuretic Peptide(BNP) [[id number]]  (Abnormal) Collected: 05/10/23 0503    Lab Status: Final result Specimen: Blood from Arm, Right Updated: 05/10/23 0824      pg/mL     Lactic acid 2 Hours [[id number]]  (Normal) Collected: 05/10/23 0737    Lab Status: Final result Specimen: Blood from Hand, Left Updated: 05/10/23 0811     LACTIC ACID 1 8 mmol/L     Narrative:      Result may be elevated if tourniquet was used during collection  HS Troponin I 2hr [[id number]]  (Abnormal) Collected: 05/10/23 0704    Lab Status: Final result Specimen: Blood from Arm, Right Updated: 05/10/23 0736     hs TnI 2hr 570 ng/L      Delta 2hr hsTnI 155 ng/L     Lactic acid, plasma (w/reflex if result > 2 0) [[id number]]  (Abnormal) Collected: 05/10/23 0503    Lab Status: Final result Specimen: Blood from Arm, Right Updated: 05/10/23 0555     LACTIC ACID 2 8 mmol/L     Narrative:      Result may be elevated if tourniquet was used during collection      Comprehensive metabolic panel [[id number]]  (Abnormal) Collected: 05/10/23 0503    Lab Status: Final result Specimen: Blood from Arm, Right Updated: 05/10/23 0541     Sodium 136 mmol/L      Potassium 3 5 mmol/L      Chloride 101 mmol/L      CO2 25 mmol/L      ANION GAP 10 mmol/L      BUN 15 mg/dL      Creatinine 0 91 mg/dL      Glucose 121 mg/dL      Calcium 9 4 mg/dL      AST 60 U/L      ALT 64 U/L      Alkaline Phosphatase 64 U/L      Total Protein 7 6 g/dL      Albumin 4 7 g/dL      Total Bilirubin 0 38 mg/dL      eGFR 101 ml/min/1 73sq m     Narrative:      [facility] guidelines for Chronic Kidney Disease (CKD):   •  Stage 1 with normal or high GFR (GFR > 90 mL/min/1 73 square meters)  •  Stage 2 Mild CKD (GFR = 60-89 mL/min/1 73 square meters)  •  Stage 3A Moderate CKD (GFR = 45-59 mL/min/1 73 square meters)  •  Stage 3B Moderate CKD (GFR = 30-44 mL/min/1 73 square meters)  •  Stage 4 Severe CKD (GFR = 15-29 mL/min/1 73 square meters)  •  Stage 5 End Stage CKD (GFR <15 mL/min/1 73 square meters)  Note: GFR calculation is accurate only with a steady state creatinine    HS Troponin 0hr (reflex protocol) [[id number]]  (Abnormal) Collected: 05/10/23 0503    Lab Status: Final result Specimen: Blood from Arm, Right Updated: 05/10/23 0532     hs TnI 0hr 415 ng/L     Protime-INR [[id number]]  (Normal) Collected: 05/10/23 0503    Lab Status: Final result Specimen: Blood from Arm, Right Updated: 05/10/23 0524     Protime 13 3 seconds      INR 1 03    APTT [[id number]]  (Normal) Collected: 05/10/23 0503    Lab Status: Final result Specimen: Blood from Arm, Right Updated: 05/10/23 0524     PTT 25 seconds     CBC and differential [[id number]]  (Abnormal) Collected: 05/10/23 0503    Lab Status: Final result Specimen: Blood from Arm, Right Updated: 05/10/23 0516     WBC 10 24 Thousand/uL      RBC 5 13 Million/uL      Hemoglobin 14 8 g/dL      Hematocrit 45 0 %      MCV 88 fL      MCH 28 8 pg      MCHC 32 9 g/dL      RDW 15 6 %      MPV 8 5 fL      Platelets 076 Thousands/uL      nRBC 0 /100 WBCs      Neutrophils Relative 79 %      Immat GRANS % 0 %      Lymphocytes Relative 11 %      Monocytes Relative 10 %      Eosinophils Relative 0 %      Basophils Relative 0 %      Neutrophils Absolute 8 02 Thousands/µL      Immature Grans Absolute 0 04 Thousand/uL      Lymphocytes Absolute 1 13 Thousands/µL      Monocytes Absolute 0 99 Thousand/µL      Eosinophils Absolute 0 02 Thousand/µL      Basophils Absolute 0 04 Thousands/µL     Blood gas, venous [[id number]] Collected: 05/10/23 0503    Lab Status: Final result Specimen: Blood from Arm, Right Updated: 05/10/23 0510     pH, Gerardo 7 334     pCO2, Gerardo 48 6 mm Hg      pO2, Gerardo 37 7 mm Hg      HCO3, Gerardo 25 3 mmol/L      Base Excess, Gerardo -1 2 mmol/L      O2 Content, Gerardo 14 9 ml/dL      O2 HGB, VENOUS 65 9 %                  CTA ED chest PE Study   Final Result by Terral Epley, DO (05/10 0710)      No pulmonary embolism is seen        Reticulonodular opacities noted in the bilateral lungs, most prominently in the bilateral lower lobes, the inferior aspect of the right upper lobe, and in the right middle lobe  Patchy alveolar opacities are seen in the right lower lobe; suspicious for    multifocal infection in the appropriate clinical setting  Correlation with the patient's symptoms and laboratory values recommended  Shotty mediastinal and hilar lymph nodes, nonspecific, possibly reactive  Other findings as above  Workstation performed: [id number]         XR chest 1 view portable   Final Result by Tasia Soto MD (05/10 5122)   Mild vascular congestion      Similar to previous               Workstation performed: [id number]                    Procedures  Procedures  Conscious Sedation Assessment    Flowsheet Row Classification Score   ASA Scale Assessment 2-Mild to moderate systemic disease, medically well controlled, with no functional limitation filed at 05/11/2023 1155   Mallampati Classification Class II: soft palate, uvula, fauces visible - No Difficulty filed at 05/11/2023 1155             ED Course  ED Course as of 05/10/23 2106   Wed May 10, 2023   0624 Patient's troponin significantly elevated  Repeat EKG demonstrating normal sinus rhythm with no acute ST segment changes  There are nonspecific findings  This was repeated with similar findings  Unclear etiology of patient's elevated troponin, may represent demand ischemia however considering unusual circumstances as patient's troponin was negative during prior evaluation, will obtain CTA imaging of patient's chest to evaluate for alternative etiologies including pulmonary embolism  9048 LACTIC ACID(!!): 2 8  Likely secondary to patient's use of nebulized medications  1160 Patient CTA demonstrating no signs of pulmonary embolism however does show some patchy findings concerning for pneumonia  I do not feel patient is septic and laboratory findings secondary to other etiologies though the potential sepsis identified only after CT imaging completed     1928 Discussed with on-call cardiology as patient's troponin continues to elevate  Repeat EKG on my evaluation does not demonstrate any acute ST elevation  Cardiology reviewed and agrees, suggested adding heparin to the ASA that I had already ordered  They will consult to evaluate later  I feel wheezing still likely secondary to patient's underlying cause and patient improving with typical asthma medications, less likely cardiogenic    0802 Patient denies any contraindications to heparin  Denies any concern about having pork products  Discussed and updated patient on plan  SBIRT 22yo+    Flowsheet Row Most Recent Value   Initial Alcohol Screen: US AUDIT-C     1  How often do you have a drink containing alcohol? 0 Filed at: 05/10/2023 0519   2  How many drinks containing alcohol do you have on a typical day you are drinking? 0 Filed at: 05/10/2023 0519   3a  Male UNDER 65: How often do you have five or more drinks on one occasion? 0 Filed at: 05/10/2023 0519   3b  FEMALE Any Age, or MALE 65+: How often do you have 4 or more drinks on one occassion? 0 Filed at: 05/10/2023 0519   Audit-C Score 0 Filed at: 05/10/2023 0732   KAREEM: How many times in the past year have you    Used an illegal drug or used a prescription medication for non-medical reasons?  Never Filed at: 05/10/2023 0519        JAYDON Risk Score    Flowsheet Row Most Recent Value   Age >= 72 0 Filed at: 05/10/2023 1116   Known CAD (stenosis >= 50%) 0 Filed at: 05/10/2023 1116   Recent (<=24 hrs) Service Angina 0 Filed at: 05/10/2023 1116   ST Deviation >= 0 5 mm 0 Filed at: 05/10/2023 1116   3+ CAD Risk Factors (FHx, HTN, HLP, DM, Smoker) 1 Filed at: 05/10/2023 1116   Aspirin Use Past 7 Days 0 Filed at: 05/10/2023 1116   Elevated Cardiac Markers 1 Filed at: 05/10/2023 1116   JAYDON Risk Score (Calculated) 2 Filed at: 05/10/2023 1116                  MDM    Disposition  Final diagnoses:   Asthma exacerbation   Acute respiratory failure with hypoxemia ([address] ) Pneumonia   Elevated troponin     Time reflects when diagnosis was documented in both MDM as applicable and the Disposition within this note     Time User Action Codes Description Comment    5/10/2023  6:06 AM Beatriz Amaral Add [V52 631] Mild asthma, unspecified whether complicated, unspecified whether persistent     5/10/2023  7:36 AM Marolyn Dage Add [J43 198] Asthma exacerbation     5/10/2023  7:37 AM Marolyn Dage Add [J96 01] Acute respiratory failure with hypoxemia [facility] )     5/10/2023  7:37 AM Marolyn Dage Add [J18 9] Pneumonia     5/10/2023  8:01 AM Marolyn Dage Add [R77 8] Elevated troponin     5/10/2023  2:59 PM Janet Muss R Add [R07 9] Chest pain, unspecified type     5/10/2023  3:50 PM Ilean Grandchild Modify [R77 8] Elevated troponin     5/10/2023  3:50 PM Ilean Grandchild Modify [R07 9] Chest pain, unspecified type     5/11/2023 10:20 AM Hernandez Maycol Add [F31 9] Bipolar 1 disorder ([facility] )     5/11/2023 10:20 AM Hernandez Maycol Add Bahari Battiest  A] Depression, unspecified depression type     5/12/2023  8:58 AM Juan Coca A Modify [R77 8] Elevated troponin     5/12/2023  8:58 AM Juan Coca A Modify [R07 9] Chest pain, unspecified type     5/12/2023  8:58 AM Kenia Grief, Felisa A Add [I50 9] Acute on chronic congestive heart failure, unspecified heart failure type ([facility] )     5/12/2023  8:58 AM Juan Coca A Add [U14 376] Uncomplicated asthma, unspecified asthma severity, unspecified whether persistent     5/13/2023  4:38 PM Chrystine Gautam Add [Z72 0] Tobacco abuse       ED Disposition     ED Disposition   Admit    Condition   Stable    Date/Time   Wed May 10, 2023  7:33 AM    Comment   Case was discussed with JOSE MARIA and the patient's admission status was agreed to be Admission Status: inpatient status to the service of Dr Alcon Howell     Follow up With Specialties Details Why Contact Info Additional Information    Infolink  Schedule an appointment as soon as possible for a visit [address] [facility],[address] [facility] Cardiology Schedule an appointment as soon as possible for a visit As needed [facility] [address]  [place] [address]  [phone]  [facility] Cardiology [facility] [address], [facility] [address], [facility], [place], [address]   [facility] Cardiology   [address] [address]  [facility] Cardiology [address] [facility] [address], [address] [facility], [address], [address] [facility] Pulmonology Schedule an appointment as soon as possible for a visit If symptoms worsen [facility] [address]  [place] [address]  [facility] Pulmonary [place] [facility], [address], [facility], [place], [address]   [address] [facility] Pulmonary [address] Pulmonology   [facility] 36 [address]  [facility] Pulmonary [address] [place] [address], [address], [address] [facility]          Discharge Medication List as of 5/13/2023  4:52 PM      START taking these medications    Details   aspirin 81 mg chewable tablet Chew 1 tablet (81 mg total) daily Do not start before May 14, 2023 , Starting Sun 5/14/2023, Until Tue 6/13/2023, Normal      diazepam (VALIUM) 10 mg tablet Take 1 tablet (10 mg total) by mouth daily at bedtime for 10 days, Starting Sat 5/13/2023, Until Tue 5/23/2023, Normal      furosemide (LASIX) 40 mg tablet Take 1 tablet (40 mg total) by mouth daily Do not start before May 14, 2023 , Starting Sun 5/14/2023, Normal      nicotine (NICODERM CQ) 14 mg/24hr TD 24 hr patch Place 1 patch on the skin over 24 hours daily Do not start before May 14, 2023 , Starting Sun 5/14/2023, Normal      OLANZapine (ZyPREXA ZYDIS) 5 mg dispersible tablet Take 1 tablet (5 mg total) by mouth daily Do not start before May 14, 2023 , Starting Sun 5/14/2023, Normal      prazosin (MINIPRESS) 2 mg capsule Take 1 capsule (2 mg total) by mouth daily Do not start before May 14, 2023 , Starting Sun 5/14/2023, Normal         CONTINUE these medications which have CHANGED    Details   hydrOXYzine HCL (ATARAX) 10 mg tablet Take 1 tablet (10 mg total) by mouth every 12 (twelve) hours as needed for anxiety, Starting Sat 5/13/2023, Normal      predniSONE 10 mg tablet Multiple Dosages:Starting Sat 5/13/2023, Until Mon 5/15/2023 at 2359, THEN Starting Tue 5/16/2023, Until Thu 5/18/2023 at 2359, THEN Starting Fri 5/19/2023, Until Sun 5/21/2023 at 2359, THEN Starting Mon 5/22/2023, Until Wed 5/24/2023 at 2359Take 4  tablets (40 mg total) by mouth daily for 3 days, THEN 3 tablets (30 mg total) daily for 3 days, THEN 2 tablets (20 mg total) daily for 3 days, THEN 1 tablet (10 mg total) daily for 3 days  , Normal         CONTINUE these medications which have NOT CHANGED    Details   carBAMazepine (TEGretol XR) 400 mg 12 hr tablet Take 400 mg by mouth 2 (two) times a day, Historical Med      cariprazine (VRAYLAR) 4 5 MG capsule Take 4 5 mg by mouth daily, Historical Med      ipratropium-albuterol (DUO-NEB) 0 5-2 5 mg/3 mL nebulizer solution Take 3 mL by nebulization 4 (four) times a day, Starting Sun 5/7/2023, Print      levETIRAcetam (KEPPRA) 500 mg tablet Take 500 mg by mouth every 12 (twelve) hours, Historical Med      mirtazapine (REMERON) 15 mg tablet Take 15 mg by mouth daily at bedtime, Historical Med         STOP taking these medications       azithromycin (Zithromax Z-Kingsley) 250 mg tablet Comments:   Reason for Stopping:               Outpatient Discharge Orders   Oxygen     Ambulatory referral to Cardiology   Standing Status: Future Standing Exp   Date: 05/13/24      Activity as tolerated       PDMP Review     None          ED Provider  Electronically Signed by           Jovany De Guzman MD  05/15/23 1522

## 2023-05-10 NOTE — ASSESSMENT & PLAN NOTE
· Troponin continuing to uptrend at this time; continue to trend until peak  · EKG non-ischemic   · Reporting chest tightness as above   · CTA PE study negative for any pulmonary embolism  · Given full dose ASA  · Continue heparin drip  · Cardiology consulted; recommendations appreciated

## 2023-05-10 NOTE — ED NOTES
XR at bedside     Kristi Perkins, [facility]  05/10/23 7477

## 2023-05-10 NOTE — CONSULTS
Consultation - Cardiology   Linh Vaugnh 41 y o  male MRN: [id number]  Unit/Bed#: ED 09 Encounter: [id number]  05/10/23  1:10 PM    Assessment/ Plan:  1  Elevated troponin  • High sensitivity troponin peak 732  • Patient endorses shortness or breath, chest heaviness, concern for anginal equivalent  • ECG on admission showed ST, nonspecific ST abnormality  • Plan for further evaluation with cardiac catheterization  Risks and benefits of cardiac catheterization including but not limited to risk of infection, vascular injury, renal failure, bleeding, myocardial infarction, stroke, and death were discussed at length with patient  Patient understands the risks and benefits and wishes to proceed  • Continue heparin infusion   • Continue telemetry monitoring  • TTE ordered    2  Chest pain  · See plan above    3  Shortness of breath  · Patient endorses progressive shortness of breath and orthopnea  · Patient denies any improvement with nebulizer treatments  · Patient appears hypervolemic  · Start Lasix 40 mg IV twice daily  · TTE ordered  · Maintain strict I/O's, daily weights, and low sodium diet  3  Asthma  • Currently receiving nebulizer treatments  Management per primary team     4  Tobacco abuse  · Cessation advised  5  Bipolar 1 disorder/PTSD/TRUPTI    6  Seizure      History of Present Illness   Physician Requesting Consult: Tomi Rico DO    Reason for Consult / Principal Problem: Elevated troponin    HPI: Linh Vaughn is a 41y o  year old who presents with shortness of breath  PMH of asthma, bipolar  Patient has been experiencing worsening shortness of breath over the past few days  She also notes increased indigestion and has been taking Tums frequently  She was recently treated for asthma exacerbation but notes that her nebulizer treatments have had not helped her shortness of breath    She also notes that she has been having chest heaviness that feels like an elephant sitting on her chest  Patient denies any cardiac stents in the past  She endorses smoking 1PPD  Patient denies chest pain at this time  She endorses orthopnea  Upon further evaluation in the ED, patient was found to have elevated troponin  Patient received 324 mg of aspirin and nebulizer treatments  Inpatient consult to Cardiology  Consult performed by: JOSE Alvarez  Consult ordered by: Ward Matias MD          EKG: Sinus tachycardia with nonspecific ST abnormality      Review of Systems   Constitutional: Negative for chills, diaphoresis and fever  Respiratory: Positive for shortness of breath and wheezing  Negative for cough and chest tightness  Cardiovascular: Positive for chest pain  Negative for palpitations and leg swelling  Gastrointestinal: Negative for abdominal distention, blood in stool, nausea and vomiting  Genitourinary: Negative for difficulty urinating  Musculoskeletal: Negative for arthralgias and back pain  Neurological: Negative for dizziness, syncope, light-headedness and headaches  Psychiatric/Behavioral: Negative for agitation and confusion  The patient is not nervous/anxious  Historical Information   Past Medical History:   Diagnosis Date   • Asthma    • Seizure [facility])      History reviewed  No pertinent surgical history  Social History     Substance and Sexual Activity   Alcohol Use None     Social History     Substance and Sexual Activity   Drug Use Never     Social History     Tobacco Use   Smoking Status Every Day   • Types: Cigarettes   Smokeless Tobacco Never       Family History: History reviewed  No pertinent family history      Meds/Allergies   all current active meds have been reviewed and current meds:   Current Facility-Administered Medications   Medication Dose Route Frequency   • acetaminophen (TYLENOL) tablet 650 mg  650 mg Oral Q6H PRN   • calcium carbonate (TUMS) chewable tablet 500 mg  500 mg Oral Daily PRN   • carBAMazepine (TEGretol XR) 12 hr tablet 400 mg  400 mg Oral BID   • diazepam (VALIUM) tablet 10 mg  10 mg Oral HS   • furosemide (LASIX) injection 40 mg  40 mg Intravenous BID (diuretic)   • heparin (porcine) 25,000 units in 0 45% NaCl 250 mL infusion (premix)  3-20 Units/kg/hr (Order-Specific) Intravenous Titrated   • heparin (porcine) injection 2,000 Units  2,000 Units Intravenous Q6H PRN   • heparin (porcine) injection 4,000 Units  4,000 Units Intravenous Q6H PRN   • ipratropium (ATROVENT) 0 02 % inhalation solution 0 5 mg  0 5 mg Nebulization TID   • levalbuterol (XOPENEX) inhalation solution 1 25 mg  1 25 mg Nebulization Q8H PRN   • levalbuterol (XOPENEX) inhalation solution 1 25 mg  1 25 mg Nebulization TID   • levETIRAcetam (KEPPRA) tablet 500 mg  500 mg Oral Q12H [facility]ER   • methylPREDNISolone sodium succinate (Solu-MEDROL) injection 40 mg  40 mg Intravenous Q8H [facility]   • mirtazapine (REMERON) tablet 15 mg  15 mg Oral HS   • nicotine (NICODERM CQ) 14 mg/24hr TD 24 hr patch 1 patch  1 patch Transdermal Daily     Allergies   Allergen Reactions   • Amlodipine Swelling       Objective   Vitals: Blood pressure 140/84, pulse 81, temperature 98 6 °F (37 °C), temperature source Oral, resp  rate 22, SpO2 92 %  , There is no height or weight on file to calculate BMI ,   Orthostatic Blood Pressures    Flowsheet Row Most Recent Value   Blood Pressure 140/84 filed at 05/10/2023 1100   Patient Position - Orthostatic VS Lying filed at 05/10/2023 8603          Systolic (22XRN), WQF:545 , Min:117 , EWQ:894     Diastolic (99GLG), STY:98, Min:70, Max:89        Intake/Output Summary (Last 24 hours) at 5/10/2023 1310  Last data filed at 5/10/2023 1124  Gross per 24 hour   Intake 1100 ml   Output --   Net 1100 ml       Invasive Devices     Peripheral Intravenous Line  Duration           Peripheral IV 05/10/23 Left Wrist <1 day    Peripheral IV 05/10/23 Right Antecubital <1 day                    Physical Exam:  Physical Exam  Vitals and nursing note reviewed  Constitutional:       General: He is not in acute distress  Appearance: He is well-developed  He is obese  HENT:      Head: Normocephalic and atraumatic  Eyes:      Conjunctiva/sclera: Conjunctivae normal    Neck:      Vascular: No carotid bruit  Cardiovascular:      Rate and Rhythm: Normal rate and regular rhythm  Heart sounds: Normal heart sounds  No murmur heard  Pulmonary:      Effort: Pulmonary effort is normal  No respiratory distress  Breath sounds: Rales present  Abdominal:      Palpations: Abdomen is soft  Tenderness: There is no abdominal tenderness  Musculoskeletal:         General: No swelling  Cervical back: Neck supple  Right lower leg: No edema  Left lower leg: No edema  Skin:     General: Skin is warm and dry  Capillary Refill: Capillary refill takes less than 2 seconds  Neurological:      Mental Status: He is alert and oriented to person, place, and time     Psychiatric:         Mood and Affect: Mood normal           Lab Results:     Cardiac Profile:   Results from last 7 days   Lab Units 05/10/23  0912 05/10/23  0704 05/10/23  0503 05/07/23  1506   HS TNI 0HR ng/L  --   --  415* 5   HS TNI 2HR ng/L  --  570*  --   --    HSTNI D2 ng/L  --  155*  --   --    HS TNI 4HR ng/L 607*  --   --   --    HSTNI D4 ng/L 192*  --   --   --        CBC with diff:   Results from last 7 days   Lab Units 05/10/23  0503 05/07/23  1506   WBC Thousand/uL 10 24* 7 73   HEMOGLOBIN g/dL 14 8 14 6   HEMATOCRIT % 45 0 44 1   MCV fL 88 88   PLATELETS Thousands/uL 368 345   MCH pg 28 8 29 0   MCHC g/dL 32 9 33 1   RDW % 15 6* 15 7*   MPV fL 8 5* 8 9   NRBC AUTO /100 WBCs 0 0         CMP:   Results from last 7 days   Lab Units 05/10/23  0503 05/07/23  1506   POTASSIUM mmol/L 3 5 3 8   CHLORIDE mmol/L 101 103   CO2 mmol/L 25 24   BUN mg/dL 15 8   CREATININE mg/dL 0 91 0 80   CALCIUM mg/dL 9 4 9 4   AST U/L 60*  --    ALT U/L 64*  --    ALK PHOS U/L 64  --    EGFR ml/min/1 73sq m 101 107

## 2023-05-10 NOTE — ASSESSMENT & PLAN NOTE
· Patient with wheezing noted  · Seen in ED on 05/07 w/ asthma exacerbation; improved w/nebs, discharged on PO prednisone and Z-dayana  · Symptoms have recurred and continued to worsen since   · Requiring 3L NC at this time  · CXR pending official read   · After IV solumedrol 80mg, duo-neb, and 2g IV mag in ED reports feeling somewhat improved, however still symptomatic  · Scheduled IV steroids and neb treatments   · Respiratory protocol  · Continuous O2 monitoring

## 2023-05-10 NOTE — ASSESSMENT & PLAN NOTE
· Blood pressure adequately controlled  · Continue home triamterene-HCTZ 37 5-25mg daily  · Monitor BP per unit protocol

## 2023-05-10 NOTE — ASSESSMENT & PLAN NOTE
· Patient complaining of left-sided chest pain and indigestion  · JAYDON score of 2  · History of heart failure in her mother and no history of heart disease in her father  · Heparin drip started  · Rest of plan as above

## 2023-05-10 NOTE — ASSESSMENT & PLAN NOTE
· Hx of Bipolar 1, PTSD, and TRUPTI  · Continue home Vraylar and hydroxazine prn   · Holding home valium in setting of acute respiratory failure as above   · Patient reports they ran out of their Noretta Curio and need a refill  · Continue outpatient f/u

## 2023-05-10 NOTE — ASSESSMENT & PLAN NOTE
· Was 87% O2 RA while in ED  · Currently requiring 3L NC   · Suspected to be in setting of acute asthma exacerbation as above   · See tx of asthma as above  · Wean supplemental O2 as able

## 2023-05-10 NOTE — H&P
[address] [place] [address]  H&P  Name: Barbara Zhang 41 y o  male I MRN: [id number]  Unit/Bed#: ED 09 I Date of Admission: 5/10/2023   Date of Service: 5/10/2023 I Hospital Day: 0      Assessment/Plan   * Asthma  Assessment & Plan  · Patient with wheezing noted  · Seen in ED on 05/07 w/ asthma exacerbation; improved w/nebs, discharged on PO prednisone and Z-dayana  · Symptoms have recurred and continued to worsen since   · Requiring 3L NC at this time  · CXR pending official read   · After IV solumedrol 80mg, duo-neb, and 2g IV mag in ED reports feeling somewhat improved, however still symptomatic  · Scheduled IV steroids and neb treatments   · Respiratory protocol  · Continuous O2 monitoring          Elevated troponin  Assessment & Plan  · Troponin continuing to uptrend at this time; continue to trend until peak  · EKG non-ischemic   · Reporting chest tightness as above   · CTA PE study negative for any pulmonary embolism  · Given full dose ASA  · Continue heparin drip  · Cardiology consulted; recommendations appreciated     Tobacco abuse  Assessment & Plan  · Discussed importance of smoking cessation for greater than 3 minutes  · Nicotine patch ordered    Chest pain  Assessment & Plan  · Patient complaining of left-sided chest pain and indigestion  · JAYDON score of 2  · History of heart failure in her mother and no history of heart disease in her father  · Heparin drip started  · Rest of plan as above    Bipolar 1 disorder ([facility] )  Assessment & Plan  · Hx of Bipolar 1, PTSD, and TRUPTI  · Continue home Vraylar and hydroxazine prn   · Holding home valium in setting of acute respiratory failure as above   · Patient reports they ran out of their Chantelle Nicholas and need a refill  · Continue outpatient f/u    HTN (hypertension)  Assessment & Plan  · Blood pressure adequately controlled  · Continue home triamterene-HCTZ 37 5-25mg daily  · Monitor BP per unit protocol     Acute respiratory failure ([facility] )  Assessment & Plan  · Was 87% O2 RA while in ED  · Currently requiring 3L NC   · Suspected to be in setting of acute asthma exacerbation as above   · See tx of asthma as above  · Wean supplemental O2 as able     Seizure (HCC)  Assessment & Plan  · Continue home keppra 500mg BID and tegretol 400mg BID        VTE Pharmacologic Prophylaxis: VTE Score: 3 Moderate Risk (Score 3-4) - Pharmacological DVT Prophylaxis Ordered: heparin drip  Code Status: Level 1 - Full Code   Discussion with family: Updated  (wife) at bedside  Anticipated Length of Stay: Patient will be admitted on an inpatient basis with an anticipated length of stay of greater than 2 midnights secondary to chest pain and asthma exacerbation   Total Time Spent on Date of Encounter in care of patient: 75 minutes This time was spent on one or more of the following: performing physical exam; counseling and coordination of care; obtaining or reviewing history; documenting in the medical record; reviewing/ordering tests, medications or procedures; communicating with other healthcare professionals and discussing with patient's family/caregivers  Chief Complaint: Shortness of breath    History of Present Illness:  Joaquin Phillips is a 41 y o  male with a PMH of asthma, bipolar who presents with shortness of breath  States that she has been having worsening shortness of breath for the past 3 to 4 days  She also states that she has had some indigestion that has been worsening as well  Patient does have history significant for smoking 1 pack/day  Also has family history of heart failure in her mother but denies any stents  Patient denies any history of heart disease in her father  Patient states that shortness of breath is improved at this time and denies any further chest pain on exam   Patient had no further complaints on examination      Review of Systems:  Review of Systems   Constitutional: Negative for chills, fatigue, fever and unexpected weight change  HENT: Negative for congestion, ear pain, sore throat and tinnitus  Eyes: Negative for visual disturbance  Respiratory: Positive for shortness of breath and wheezing  Negative for cough and chest tightness  Cardiovascular: Positive for chest pain  Negative for palpitations and leg swelling  Gastrointestinal: Negative for abdominal pain, constipation, diarrhea, nausea and vomiting  Genitourinary: Negative for dysuria and frequency  Skin: Negative for rash  Neurological: Negative for dizziness, weakness, light-headedness, numbness and headaches  All other systems reviewed and are negative  Past Medical and Surgical History:   Past Medical History:   Diagnosis Date   • Asthma    • Seizure [facility])        History reviewed  No pertinent surgical history  Meds/Allergies:  Prior to Admission medications    Medication Sig Start Date End Date Taking? Authorizing Provider   carBAMazepine (TEGretol XR) 400 mg 12 hr tablet Take 400 mg by mouth 2 (two) times a day   Yes Historical Provider, MD   cariprazine (VRAYLAR) 4 5 MG capsule Take 4 5 mg by mouth daily   Yes Historical Provider, MD   hydrOXYzine HCL (ATARAX) 25 mg tablet Take 25 mg by mouth every 6 (six) hours as needed for anxiety   Yes Historical Provider, MD   levETIRAcetam (KEPPRA) 500 mg tablet Take 500 mg by mouth every 12 (twelve) hours   Yes Historical Provider, MD   mirtazapine (REMERON) 15 mg tablet Take 15 mg by mouth daily at bedtime   Yes Historical Provider, MD   azithromycin (Zithromax Z-Kingsley) 250 mg tablet Take 2 tablets today then 1 tablet daily x 4 days 5/7/23 5/11/23  Romero Odonnell MD   ipratropium-albuterol (DUO-NEB) 0 5-2 5 mg/3 mL nebulizer solution Take 3 mL by nebulization 4 (four) times a day 5/7/23   Romero Odonnell MD   predniSONE 50 mg tablet Take 1 tablet (50 mg total) by mouth daily 5/7/23   Romero Odonnell MD     I have reviewed home medications with patient personally  Allergies:    Allergies   Allergen Reactions   • Amlodipine Swelling       Social History:  Marital Status: /Civil Union     Patient Pre-hospital Living Situation: Home  Patient Pre-hospital Level of Mobility: walks  Substance Use History:   Social History     Substance and Sexual Activity   Alcohol Use None     Social History     Tobacco Use   Smoking Status Every Day   • Types: Cigarettes   Smokeless Tobacco Never     Social History     Substance and Sexual Activity   Drug Use Never       Family History:  History reviewed  No pertinent family history  Physical Exam:     Vitals:   Blood Pressure: 140/84 (05/10/23 1100)  Pulse: 81 (05/10/23 1306)  Temperature: 98 6 °F (37 °C) (05/10/23 0911)  Temp Source: Oral (05/10/23 1100)  Respirations: 22 (05/10/23 1306)  SpO2: 92 % (05/10/23 1306)    Physical Exam  Vitals and nursing note reviewed  Constitutional:       General: He is not in acute distress  Appearance: He is well-developed  He is obese  HENT:      Head: Normocephalic and atraumatic  Eyes:      General: No scleral icterus  Conjunctiva/sclera: Conjunctivae normal    Cardiovascular:      Rate and Rhythm: Normal rate and regular rhythm  Heart sounds: Normal heart sounds  No murmur heard  No friction rub  No gallop  Pulmonary:      Effort: Pulmonary effort is normal  No respiratory distress  Breath sounds: Wheezing present  No rales  Abdominal:      General: Bowel sounds are normal  There is no distension  Palpations: Abdomen is soft  Tenderness: There is no abdominal tenderness  Musculoskeletal:         General: Normal range of motion  Skin:     General: Skin is warm  Findings: No rash  Neurological:      Mental Status: He is alert and oriented to person, place, and time            Additional Data:     Lab Results:  Results from last 7 days   Lab Units 05/10/23  0503   WBC Thousand/uL 10 24*   HEMOGLOBIN g/dL 14 8   HEMATOCRIT % 45 0   PLATELETS Thousands/uL 368   NEUTROS PCT % 79* LYMPHS PCT % 11*   MONOS PCT % 10   EOS PCT % 0     Results from last 7 days   Lab Units 05/10/23  0503   SODIUM mmol/L 136   POTASSIUM mmol/L 3 5   CHLORIDE mmol/L 101   CO2 mmol/L 25   BUN mg/dL 15   CREATININE mg/dL 0 91   ANION GAP mmol/L 10   CALCIUM mg/dL 9 4   ALBUMIN g/dL 4 7   TOTAL BILIRUBIN mg/dL 0 38   ALK PHOS U/L 64   ALT U/L 64*   AST U/L 60*   GLUCOSE RANDOM mg/dL 121     Results from last 7 days   Lab Units 05/10/23  0503   INR  1 03             Results from last 7 days   Lab Units 05/10/23  0737 05/10/23  0503   LACTIC ACID mmol/L 1 8 2 8*       Lines/Drains:  Invasive Devices     Peripheral Intravenous Line  Duration           Peripheral IV 05/10/23 Left Wrist <1 day    Peripheral IV 05/10/23 Right Antecubital <1 day                    Imaging: Reviewed radiology reports from this admission including: chest CT scan  CTA ED chest PE Study   Final Result by Jimmie Becker DO (05/10 0710)      No pulmonary embolism is seen  Reticulonodular opacities noted in the bilateral lungs, most prominently in the bilateral lower lobes, the inferior aspect of the right upper lobe, and in the right middle lobe  Patchy alveolar opacities are seen in the right lower lobe; suspicious for    multifocal infection in the appropriate clinical setting  Correlation with the patient's symptoms and laboratory values recommended  Shotty mediastinal and hilar lymph nodes, nonspecific, possibly reactive  Other findings as above  Workstation performed: [id number]         XR chest 1 view portable   Final Result by Latisha Del Angel MD (05/10 2101)   Mild vascular congestion      Similar to previous               Workstation performed: [id number]             EKG and Other Studies Reviewed on Admission:   · EKG: NSR  HR 99     ** Please Note: This note has been constructed using a voice recognition system   **

## 2023-05-10 NOTE — LETTER
[address] [facility]  [address] 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

## 2023-05-11 PROBLEM — I50.9 ACUTE EXACERBATION OF CONGESTIVE HEART FAILURE (HCC): Status: ACTIVE | Noted: 2023-05-11

## 2023-05-11 PROBLEM — E66.01 MORBID OBESITY WITH BMI OF 45.0-49.9, ADULT (HCC): Status: ACTIVE | Noted: 2023-05-11

## 2023-05-11 LAB
ANION GAP SERPL CALCULATED.3IONS-SCNC: 7 MMOL/L (ref 4–13)
APTT PPP: 26 SECONDS (ref 23–37)
APTT PPP: 43 SECONDS (ref 23–37)
BUN SERPL-MCNC: 12 MG/DL (ref 5–25)
CALCIUM SERPL-MCNC: 9.1 MG/DL (ref 8.4–10.2)
CHLORIDE SERPL-SCNC: 103 MMOL/L (ref 96–108)
CO2 SERPL-SCNC: 27 MMOL/L (ref 21–32)
CREAT SERPL-MCNC: 0.72 MG/DL (ref 0.6–1.3)
ERYTHROCYTE [DISTWIDTH] IN BLOOD BY AUTOMATED COUNT: 15.8 % (ref 11.6–15.1)
GFR SERPL CREATININE-BSD FRML MDRD: 112 ML/MIN/1.73SQ M
GLUCOSE SERPL-MCNC: 118 MG/DL (ref 65–140)
HCT VFR BLD AUTO: 44 % (ref 36.5–49.3)
HGB BLD-MCNC: 14.3 G/DL (ref 12–17)
MCH RBC QN AUTO: 28.4 PG (ref 26.8–34.3)
MCHC RBC AUTO-ENTMCNC: 32.5 G/DL (ref 31.4–37.4)
MCV RBC AUTO: 88 FL (ref 82–98)
PLATELET # BLD AUTO: 380 THOUSANDS/UL (ref 149–390)
PMV BLD AUTO: 8.8 FL (ref 8.9–12.7)
POTASSIUM SERPL-SCNC: 3.9 MMOL/L (ref 3.5–5.3)
RBC # BLD AUTO: 5.03 MILLION/UL (ref 3.88–5.62)
SODIUM SERPL-SCNC: 137 MMOL/L (ref 135–147)
WBC # BLD AUTO: 9.54 THOUSAND/UL (ref 4.31–10.16)

## 2023-05-11 RX ORDER — NITROGLYCERIN 20 MG/100ML
INJECTION INTRAVENOUS CODE/TRAUMA/SEDATION MEDICATION
Status: DISCONTINUED | OUTPATIENT
Start: 2023-05-11 | End: 2023-05-11 | Stop reason: HOSPADM

## 2023-05-11 RX ORDER — LANOLIN ALCOHOL/MO/W.PET/CERES
400 CREAM (GRAM) TOPICAL 2 TIMES DAILY
Status: DISCONTINUED | OUTPATIENT
Start: 2023-05-11 | End: 2023-05-13 | Stop reason: HOSPADM

## 2023-05-11 RX ORDER — MIDAZOLAM HYDROCHLORIDE 2 MG/2ML
INJECTION, SOLUTION INTRAMUSCULAR; INTRAVENOUS CODE/TRAUMA/SEDATION MEDICATION
Status: DISCONTINUED | OUTPATIENT
Start: 2023-05-11 | End: 2023-05-11 | Stop reason: HOSPADM

## 2023-05-11 RX ORDER — FENTANYL CITRATE 50 UG/ML
INJECTION, SOLUTION INTRAMUSCULAR; INTRAVENOUS CODE/TRAUMA/SEDATION MEDICATION
Status: DISCONTINUED | OUTPATIENT
Start: 2023-05-11 | End: 2023-05-11 | Stop reason: HOSPADM

## 2023-05-11 RX ORDER — LIDOCAINE HYDROCHLORIDE 10 MG/ML
INJECTION, SOLUTION EPIDURAL; INFILTRATION; INTRACAUDAL; PERINEURAL CODE/TRAUMA/SEDATION MEDICATION
Status: DISCONTINUED | OUTPATIENT
Start: 2023-05-11 | End: 2023-05-11 | Stop reason: HOSPADM

## 2023-05-11 RX ORDER — ENOXAPARIN SODIUM 100 MG/ML
40 INJECTION SUBCUTANEOUS
Status: DISCONTINUED | OUTPATIENT
Start: 2023-05-12 | End: 2023-05-13 | Stop reason: HOSPADM

## 2023-05-11 RX ORDER — SODIUM CHLORIDE 9 MG/ML
75 INJECTION, SOLUTION INTRAVENOUS CONTINUOUS
Status: DISPENSED | OUTPATIENT
Start: 2023-05-11 | End: 2023-05-11

## 2023-05-11 RX ORDER — HEPARIN SODIUM 1000 [USP'U]/ML
INJECTION, SOLUTION INTRAVENOUS; SUBCUTANEOUS CODE/TRAUMA/SEDATION MEDICATION
Status: DISCONTINUED | OUTPATIENT
Start: 2023-05-11 | End: 2023-05-11 | Stop reason: HOSPADM

## 2023-05-11 RX ORDER — HYDRALAZINE HYDROCHLORIDE 20 MG/ML
5 INJECTION INTRAMUSCULAR; INTRAVENOUS ONCE
Status: COMPLETED | OUTPATIENT
Start: 2023-05-11 | End: 2023-05-11

## 2023-05-11 RX ADMIN — DIAZEPAM 10 MG: 5 TABLET ORAL at 21:32

## 2023-05-11 RX ADMIN — METHYLPREDNISOLONE SODIUM SUCCINATE 40 MG: 40 INJECTION, POWDER, FOR SOLUTION INTRAMUSCULAR; INTRAVENOUS at 21:32

## 2023-05-11 RX ADMIN — IPRATROPIUM BROMIDE 0.5 MG: 0.5 SOLUTION RESPIRATORY (INHALATION) at 19:18

## 2023-05-11 RX ADMIN — LEVALBUTEROL HYDROCHLORIDE 1.25 MG: 1.25 SOLUTION RESPIRATORY (INHALATION) at 19:18

## 2023-05-11 RX ADMIN — FUROSEMIDE 40 MG: 10 INJECTION, SOLUTION INTRAVENOUS at 08:16

## 2023-05-11 RX ADMIN — LEVETIRACETAM 500 MG: 500 TABLET, FILM COATED ORAL at 08:16

## 2023-05-11 RX ADMIN — LEVALBUTEROL HYDROCHLORIDE 1.25 MG: 1.25 SOLUTION RESPIRATORY (INHALATION) at 13:54

## 2023-05-11 RX ADMIN — MIRTAZAPINE 15 MG: 15 TABLET, FILM COATED ORAL at 21:32

## 2023-05-11 RX ADMIN — CARBAMAZEPINE 400 MG: 100 TABLET, EXTENDED RELEASE ORAL at 09:19

## 2023-05-11 RX ADMIN — SODIUM CHLORIDE 75 ML/HR: 0.9 INJECTION, SOLUTION INTRAVENOUS at 12:36

## 2023-05-11 RX ADMIN — HEPARIN SODIUM 2000 UNITS: 1000 INJECTION INTRAVENOUS; SUBCUTANEOUS at 08:20

## 2023-05-11 RX ADMIN — LEVALBUTEROL HYDROCHLORIDE 1.25 MG: 1.25 SOLUTION RESPIRATORY (INHALATION) at 07:45

## 2023-05-11 RX ADMIN — CARBAMAZEPINE 400 MG: 100 TABLET, EXTENDED RELEASE ORAL at 17:08

## 2023-05-11 RX ADMIN — IPRATROPIUM BROMIDE 0.5 MG: 0.5 SOLUTION RESPIRATORY (INHALATION) at 07:45

## 2023-05-11 RX ADMIN — METHYLPREDNISOLONE SODIUM SUCCINATE 40 MG: 40 INJECTION, POWDER, FOR SOLUTION INTRAMUSCULAR; INTRAVENOUS at 05:37

## 2023-05-11 RX ADMIN — HEPARIN SODIUM 19.1 UNITS/KG/HR: 10000 INJECTION, SOLUTION INTRAVENOUS at 05:32

## 2023-05-11 RX ADMIN — LEVETIRACETAM 500 MG: 500 TABLET, FILM COATED ORAL at 21:32

## 2023-05-11 RX ADMIN — FUROSEMIDE 40 MG: 10 INJECTION, SOLUTION INTRAVENOUS at 15:22

## 2023-05-11 RX ADMIN — HYDRALAZINE HYDROCHLORIDE 5 MG: 20 INJECTION INTRAMUSCULAR; INTRAVENOUS at 00:31

## 2023-05-11 RX ADMIN — ASPIRIN 81 MG: 81 TABLET, CHEWABLE ORAL at 08:16

## 2023-05-11 RX ADMIN — METHYLPREDNISOLONE SODIUM SUCCINATE 40 MG: 40 INJECTION, POWDER, FOR SOLUTION INTRAMUSCULAR; INTRAVENOUS at 15:22

## 2023-05-11 RX ADMIN — IPRATROPIUM BROMIDE 0.5 MG: 0.5 SOLUTION RESPIRATORY (INHALATION) at 13:55

## 2023-05-11 RX ADMIN — Medication 400 MG: at 17:08

## 2023-05-11 RX ADMIN — Medication 400 MG: at 11:36

## 2023-05-11 NOTE — ASSESSMENT & PLAN NOTE
· Patient complaining of left-sided chest pain and indigestion  · JAYDON score of 2  · Troponin peaked at 772  · EKG non-ischemic   · CTA PE study negative for any pulmonary embolism  · Cardiology consulted  · Continue heparin drip  · Plan for cardiac cath  · Follow-up echocardiogram

## 2023-05-11 NOTE — ASSESSMENT & PLAN NOTE
· Patient with wheezing noted  · Seen in ED on 05/07 w/ asthma exacerbation; improved w/nebs, discharged on PO prednisone and Z-dayana  · Requiring 3L NC at this time  · CXR indicative of vascular congestion  · After IV solumedrol 80mg, duo-neb, and 2g IV mag in ED reports feeling somewhat improved, however still symptomatic  · Scheduled IV steroids and neb treatments

## 2023-05-11 NOTE — TELEMEDICINE
TeleConsultation - Chad [age] y o  male MRN: [id number]  Unit/Bed#: -01 Encounter: [id number]        REQUIRED DOCUMENTATION:     1  This service was provided via Telemedicine  2  Provider located at [facility]   3  TeleMed provider: Marcos Quinonez MD   4  Identify all parties in room with patient during tele consult:  pt  5  Patient was then informed that this was a Telemedicine visit and that the exam was being conducted confidentially over secure lines  My office door was closed  No one else was in the room  Patient acknowledged consent and understanding of privacy and security of the Telemedicine visit, and gave us permission to have the assistant stay in the room in order to assist with the history and to conduct the exam   I informed the patient that I have reviewed their record in Epic and presented the opportunity for them to ask any questions regarding the visit today  The patient agreed to participate  Assessment/Plan     Present on Admission:  • Asthma  • Acute respiratory failure (HCC)  • Bipolar 1 disorder (HCC)  • Elevated troponin  • Chest pain  • Tobacco abuse  • Acute exacerbation of congestive heart failure (HCC)    Assessment:    Bipolar 1 disorder per patient history; PTSD by patient history    Treatment Plan:    Recommend resuming prazosin 2 mg p o  daily which the patient states she has been taking consistently for the last 4 to 5 years for her PTSD right up until this admission  Since Tawni Goldberg is not on the hospital formulary, recommend Zyprexa 5 mg p o  daily for mood stabilization for bipolar disorder  Recommend resuming hydroxyzine 10 mg p o  twice daily as needed anxiety as the patient reports this has been very helpful for his anxiety prior to admission  The patient gives his informed consent for these medications  No suicide precautions are indicated    Upon discharge the patient should have outpatient psychiatric follow-up for medication management with psychotherapy/counseling component to assist her in further developing coping skills  The patient is requesting a case management to assist her with connecting with appropriate resources for this  Reconsult psychiatry as needed  Current Medications:     Current Facility-Administered Medications   Medication Dose Route Frequency Provider Last Rate   • acetaminophen  650 mg Oral Q6H PRN Mer Blum, DO     • aluminum-magnesium hydroxide-simethicone  30 mL Oral Q4H PRN Mer Blum, DO     • aspirin  81 mg Oral Daily Vernadine MD Pranav     • carBAMazepine  400 mg Oral BID Mer Blum, DO     • diazepam  10 mg Oral HS Mer Blum, DO     • furosemide  40 mg Intravenous BID (diuretic) Petra Jacques PA-C     • heparin (porcine)  3-20 Units/kg/hr (Order-Specific) Intravenous Titrated Mer Blum DO Stopped (05/11/23 1225)   • heparin (porcine)  2,000 Units Intravenous Q6H PRN Mer Blum, DO     • heparin (porcine)  4,000 Units Intravenous Q6H PRN Mer Blum, DO     • ipratropium  0 5 mg Nebulization TID Mer Blum, DO     • levalbuterol  1 25 mg Nebulization Q8H PRN Mer Blum, DO     • levalbuterol  1 25 mg Nebulization TID Mer Blum, DO     • levETIRAcetam  500 mg Oral Q12H [address] Mer Blum, DO     • magnesium Oxide  400 mg Oral BID Garnell Silver, CRNP     • methylPREDNISolone sodium succinate  40 mg Intravenous [facility] Mer Blum, DO     • mirtazapine  15 mg Oral HS Mer Blum, DO     • nicotine  1 patch Transdermal Daily Mer Blum, DO     • sodium chloride  75 mL/hr Intravenous Continuous Roselee Distance, CRNP 75 mL/hr (05/11/23 1236)       Risks / Benefits of Treatment:    Risks, benefits, and possible side effects of medications explained to patient and patient verbalizes understanding        Other treatment modalities recommended as indicated:    · outpatient referral      Inpatient consult to Psychiatry  Consult performed by: Rozina Del Real MD  Consult ordered by: Greg Russ PA-C        Physician Requesting Consult: Srinivasan Thompson,*  Principal Problem:Acute respiratory failure [facility])    Reason for Consult: Bipolar disorder; medication management      History of Present Illness      Patient is a 41 y o  male who presented to the hospital where the provider has documented the following:  Sonya Traore is a 41 y o  male with a PMH of asthma, bipolar who presents with shortness of breath  States that she has been having worsening shortness of breath for the past 3 to 4 days  She also states that she has had some indigestion that has been worsening as well  Patient does have history significant for smoking 1 pack/day  Also has family history of heart failure in her mother but denies any stents  Patient denies any history of heart disease in her father  Patient states that shortness of breath is improved at this time and denies any further chest pain on exam   Patient had no further complaints on examination      Review of Systems:  Review of Systems   Constitutional: Negative for chills, fatigue, fever and unexpected weight change  HENT: Negative for congestion, ear pain, sore throat and tinnitus  Eyes: Negative for visual disturbance  Respiratory: Positive for shortness of breath and wheezing  Negative for cough and chest tightness  Cardiovascular: Positive for chest pain  Negative for palpitations and leg swelling  Gastrointestinal: Negative for abdominal pain, constipation, diarrhea, nausea and vomiting  Genitourinary: Negative for dysuria and frequency  Skin: Negative for rash  Neurological: Negative for dizziness, weakness, light-headedness, numbness and headaches     All other systems reviewed and are negative         Past Medical and Surgical History:   Medical History[]Expand by Default        Past Medical History:   Diagnosis Date   • Asthma     • Seizure [facility])              Surgical History[]Expand by Default   History reviewed  No pertinent surgical history         Meds/Allergies:          Prior to Admission medications    Medication Sig Start Date End Date Taking? Authorizing Provider   carBAMazepine (TEGretol XR) 400 mg 12 hr tablet Take 400 mg by mouth 2 (two) times a day     Yes Historical Provider, MD   cariprazine (VRAYLAR) 4 5 MG capsule Take 4 5 mg by mouth daily     Yes Historical Provider, MD   hydrOXYzine HCL (ATARAX) 25 mg tablet Take 25 mg by mouth every 6 (six) hours as needed for anxiety     Yes Historical Provider, MD   levETIRAcetam (KEPPRA) 500 mg tablet Take 500 mg by mouth every 12 (twelve) hours     Yes Historical Provider, MD   mirtazapine (REMERON) 15 mg tablet Take 15 mg by mouth daily at bedtime     Yes Historical Provider, MD   azithromycin (Zithromax Z-Kingsley) 250 mg tablet Take 2 tablets today then 1 tablet daily x 4 days 5/7/23 5/11/23   Romero Barkley MD   ipratropium-albuterol (DUO-NEB) 0 5-2 5 mg/3 mL nebulizer solution Take 3 mL by nebulization 4 (four) times a day 5/7/23     Romero Nicholson MD   predniSONE 50 mg tablet Take 1 tablet (50 mg total) by mouth daily 5/7/23     Romero Nicholson MD      I have reviewed home medications with patient personally      Allergies: Allergies   Allergen Reactions   • Amlodipine Swelling         Social History:  Marital Status: /Civil Union      Patient Pre-hospital Living Situation: Home  Patient Pre-hospital Level of Mobility: walks  Substance Use History:   Social History          Substance and Sexual Activity   Alcohol Use None      Social History           Tobacco Use   Smoking Status Every Day   • Types: Cigarettes   Smokeless Tobacco Never      Social History          Substance and Sexual Activity   Drug Use Never        The patient tells me she has history of bipolar 1 disorder and PTSD    She states that due to difficulties affording the medication she ran out of her Vraylar 1 5 mg "daily almost 1 month ago and since then has been having significant mood lability  She has been able to continue with prazosin 2 mg p o  every morning for PTSD which she has found very helpful last 4 to 5 years  Patient states she is also on hydroxyzine 2 mg p o  twice daily for anxiety and finds that helpful states she has been needing it more often recently since off the [address]  Past psychiatric history: As above  The patient is currently attempting to establish outpatient psychiatric follow-up since moving to this area  Social history: The patient is  with 1 daughter who lives outside the home  He reports he works full-time  He reports no abuse  Family history: The patient reports his brother has bipolar disorder  This is his identical twin brother  Substance use history: The patient is a former smoker having recently stopped smoking  He reports 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  In addition alcohol he had previously used Xanax and oxycodone as well as crack cocaine  Mental status examination: The patient is alert and well oriented in all spheres  She is pleasant and cooperative with the assessment  She reports her mood has been \"up-and-down\"  Nursing reports that patient has demonstrated significant irritability becoming frustrated very easily with significant mood lability  For example nursing reported just prior to me meeting with the patient that may have been a mixup with her lunch order at the patient through her phone  Speech is unremarkable  Sensorium is clear the thought process is logical and linear  Thought content is reality based  Associations are tight  Memory is intact in all spheres  He appears to be an average intelligence by his use vocabulary, general fund of knowledge, semistructured and syntax  He denies suicidal homicidal ideation  He denies hallucinations and other psychotic features    Insight and judgment " intact  He is motivated for treatment of his bipolar 1 disorder and PTSD  Past Medical History:   Diagnosis Date   • Asthma    • Seizure [facility])        Medical Review Of Systems:    Review of Systems    Meds/Allergies     all current active meds have been reviewed  Allergies   Allergen Reactions   • Latex Rash   • Amlodipine Swelling       Objective     Vital signs in last 24 hours:  Temp:  [97 9 °F (36 6 °C)-98 8 °F (37 1 °C)] 98 8 °F (37 1 °C)  HR:  [67-94] 76  Resp:  [16-20] 18  BP: (130-160)/() 130/73      Intake/Output Summary (Last 24 hours) at 5/11/2023 1447  Last data filed at 5/11/2023 1041  Gross per 24 hour   Intake 82 31 ml   Output 2325 ml   Net -2242 69 ml       Lab Results: I have personally reviewed all pertinent laboratory/tests results  Imaging Studies: XR chest 1 view portable    Result Date: 5/10/2023  Narrative: CHEST INDICATION:   sob  COMPARISON: 5/7/2023 EXAM PERFORMED/VIEWS:  XR CHEST PORTABLE Single view FINDINGS: Persistent suspicion of mild vascular congestion Cardiomediastinal silhouette appears unremarkable  The lungs are clear  No pneumothorax or pleural effusion  Osseous structures appear within normal limits for patient age  Impression: Mild vascular congestion Similar to previous Workstation performed: [id number]     XR chest 1 view portable    Result Date: 5/8/2023  Narrative: CHEST INDICATION:   sob, wheezing  COMPARISON:  None EXAM PERFORMED/VIEWS:  XR CHEST PORTABLE Single view FINDINGS: Mild vascular congestion Cardiomediastinal silhouette appears unremarkable  The lungs are otherwise clear  No pneumothorax or pleural effusion  Osseous structures appear within normal limits for patient age  Impression: Mild vascular congestion Workstation performed: [id number]     Cardiac catheterization    Result Date: 5/11/2023  Narrative: •  No significant obstructive epicardial coronary artery disease •  Right radial access  Hemostasis achieved with manual pressure  No significant obstructive epicardial coronary artery disease     CTA ED chest PE Study    Result Date: 5/10/2023  Narrative: CTA - CHEST WITH IV CONTRAST - PULMONARY ANGIOGRAM INDICATION:   sob / hypoxic with +troponin  COMPARISON: Chest x-ray dated 5/10/2023 and 5/7/2020 2320  TECHNIQUE: CTA examination of the chest was performed using angiographic technique according to a protocol specifically tailored to evaluate for pulmonary embolism  Multiplanar 2D reformatted images were created from the source data  In addition, coronal 3D MIP postprocessing was performed on the acquisition scanner  Radiation dose length product (DLP) for this visit:  527 mGy-cm   This examination, like all CT scans performed in the [facility], was performed utilizing techniques to minimize radiation dose exposure, including the use of iterative reconstruction and automated exposure control  IV Contrast:  100 mL of iohexol (OMNIPAQUE) FINDINGS: PULMONARY ARTERIAL TREE:  No pulmonary embolus is seen  LUNGS: Reticulonodular opacities noted in the bilateral lungs, most prominently in the bilateral lower lobes, the inferior aspect of the right upper lobe, and in the right middle lobe  Patchy alveolar opacities are seen in the right lower lobe, suspicious for multifocal infection in the appropriate clinical setting  Some scattered air trapping is seen in the lungs which correlates with the patient's history of asthma/reactive airway disease  PLEURA:  Unremarkable  HEART/GREAT VESSELS:  Unremarkable for patient's age  No thoracic aortic aneurysm  MEDIASTINUM AND NETO: Shotty mediastinal and hilar lymph nodes, nonspecific, possibly reactive  CHEST WALL AND LOWER NECK:   Unremarkable  VISUALIZED STRUCTURES IN THE UPPER ABDOMEN:  Unremarkable  OSSEOUS STRUCTURES:  No acute fracture or destructive osseous lesion  Impression: No pulmonary embolism is seen   Reticulonodular opacities noted in the bilateral lungs, most prominently in the bilateral lower lobes, the inferior aspect of the right upper lobe, and in the right middle lobe  Patchy alveolar opacities are seen in the right lower lobe; suspicious for multifocal infection in the appropriate clinical setting  Correlation with the patient's symptoms and laboratory values recommended  Shotty mediastinal and hilar lymph nodes, nonspecific, possibly reactive  Other findings as above  Workstation performed: [id number]     Echo complete w/ contrast if indicated    Result Date: 5/10/2023  Narrative: •  Left Ventricle: Left ventricular cavity size is normal  Wall thickness is normal  The left ventricular ejection fraction is 60%  Systolic function is normal  Wall motion is normal  Diastolic function is normal  •  Left Atrium: The atrium is mildly dilated  •  Right Atrium: The atrium is mildly dilated  •  Mitral Valve: There is annular calcification  There is at least moderate regurgitation  Possible restriction of the posterior leaflet  There is mild stenosis  Consider further evaluation with ZIYAD  •  IVC/SVC: The inferior vena cava is dilated  EKG/Pathology/Other Studies:   Lab Results   Component Value Date    VENTRATE 99 05/10/2023    ATRIALRATE 99 05/10/2023    PRINT 136 05/10/2023    QRSDINT 76 05/10/2023    QTINT 352 05/10/2023    QTCINT 451 05/10/2023    PAXIS 87 05/10/2023    QRSAXIS 61 05/10/2023    TWAVEAXIS 77 05/10/2023        Code Status: Level 1 - Full Code  Advance Directive and Living Will:      Power of :    POLST:      Screenings:    1  Nutrition Screening  · Not available on chart    2  Pain Screening  Not available on chart    3  Suicide Screening  Not available on chart    Counseling / Coordination of Care: Total floor / unit time spent today 30 minutes  Greater than 50% of total time was spent with the patient and / or family counseling and / or coordination of care   A description of the counseling / coordination of care: Chart review, patient evaluation, coordination and communication with staff, nursing and provider

## 2023-05-11 NOTE — ASSESSMENT & PLAN NOTE
· Was 87% O2 RA while in ED  · Currently requiring 2L NC   · Suspected to be in setting of acute asthma exacerbation and heart failure exacerbation  · See tx of asthma as above  · Wean supplemental O2 as able

## 2023-05-11 NOTE — RESPIRATORY THERAPY NOTE
"RT Protocol Note  Ngozi Zavala 41 y o  male MRN: [id number]  Unit/Bed#: -01 Encounter: [id number]    Assessment    Principal Problem:    Acute respiratory failure ([facility] )  Active Problems:    Asthma    Bipolar 1 disorder ([place] [address] )    Elevated troponin    Chest pain    Tobacco abuse    Morbid obesity with BMI of 45 0-49 9, adult (HCC)    Acute exacerbation of congestive heart failure ([place] [address] )      Home Pulmonary Medications:         Past Medical History:   Diagnosis Date   • Asthma    • Seizure ([place] [address] )      Social History     Socioeconomic History   • Marital status: /Civil Union     Spouse name: None   • Number of children: None   • Years of education: None   • Highest education level: None   Occupational History   • None   Tobacco Use   • Smoking status: Every Day     Types: Cigarettes   • Smokeless tobacco: Never   Vaping Use   • Vaping Use: Every day   Substance and Sexual Activity   • Alcohol use: Never   • Drug use: Never   • Sexual activity: None   Other Topics Concern   • None   Social History Narrative   • None     Social Determinants of Health     Financial Resource Strain: Not on file   Food Insecurity: Not on file   Transportation Needs: Not on file   Physical Activity: Not on file   Stress: Not on file   Social Connections: Not on file   Intimate Partner Violence: Not on file   Housing Stability: Not on file       Subjective         Objective    Physical Exam:        Vitals:  Blood pressure 153/93, pulse 77, temperature 98 8 °F (37 1 °C), temperature source Oral, resp  rate 18, height 5' 6\" (1 676 m), weight (!) 140 kg (308 lb 10 3 oz), SpO2 91 %  Imaging and other studies: I have personally reviewed pertinent reports        O2 Device: NC     Plan    Respiratory Plan: Mild Distress pathway        Resp Comments: will continue with current tx plan     "

## 2023-05-11 NOTE — PLAN OF CARE
Problem: CARDIOVASCULAR - ADULT  Goal: Maintains optimal cardiac output and hemodynamic stability  Description: INTERVENTIONS:  - Monitor I/O, vital signs and rhythm  - Monitor for S/S and trends of decreased cardiac output  - Administer and titrate ordered vasoactive medications to optimize hemodynamic stability  - Assess quality of pulses, skin color and temperature  - Assess for signs of decreased coronary artery perfusion  - Instruct patient to report change in severity of symptoms  Outcome: Progressing  Goal: Absence of cardiac dysrhythmias or at baseline rhythm  Description: INTERVENTIONS:  - Continuous cardiac monitoring, vital signs, obtain 12 lead EKG if ordered  - Administer antiarrhythmic and heart rate control medications as ordered  - Monitor electrolytes and administer replacement therapy as ordered  Outcome: Progressing     Problem: PAIN - ADULT  Goal: Verbalizes/displays adequate comfort level or baseline comfort level  Description: Interventions:  - Encourage patient to monitor pain and request assistance  - Assess pain using appropriate pain scale  - Administer analgesics based on type and severity of pain and evaluate response  - Implement non-pharmacological measures as appropriate and evaluate response  - Consider cultural and social influences on pain and pain management  - Notify physician/advanced practitioner if interventions unsuccessful or patient reports new pain  Outcome: Progressing     Problem: INFECTION - ADULT  Goal: Absence or prevention of progression during hospitalization  Description: INTERVENTIONS:  - Assess and monitor for signs and symptoms of infection  - Monitor lab/diagnostic results  - Monitor all insertion sites, i e  indwelling lines, tubes, and drains  - Monitor endotracheal if appropriate and nasal secretions for changes in amount and color  - [place] appropriate cooling/warming therapies per order  - Administer medications as ordered  - Instruct and encourage patient and family to use good hand hygiene technique  - Identify and instruct in appropriate isolation precautions for identified infection/condition  Outcome: Progressing  Goal: Absence of fever/infection during neutropenic period  Description: INTERVENTIONS:  - Monitor WBC    Outcome: Progressing     Problem: SAFETY ADULT  Goal: Patient will remain free of falls  Description: INTERVENTIONS:  - Educate patient/family on patient safety including physical limitations  - Instruct patient to call for assistance with activity   - Consult OT/PT to assist with strengthening/mobility   - Keep Call bell within reach  - Keep bed low and locked with side rails adjusted as appropriate  - Keep care items and personal belongings within reach  - Initiate and maintain comfort rounds  - Make Fall Risk Sign visible to staff  - Offer Toileting every 2 Hours, in advance of need  - Initiate/Maintain bed alarm  - Obtain necessary fall risk management equipment  - Apply yellow socks and bracelet for high fall risk patients  - Consider moving patient to room near nurses station  Outcome: Progressing  Goal: Maintain or return to baseline ADL function  Description: INTERVENTIONS:  -  Assess patient's ability to carry out ADLs; assess patient's baseline for ADL function and identify physical deficits which impact ability to perform ADLs (bathing, care of mouth/teeth, toileting, grooming, dressing, etc )  - Assess/evaluate cause of self-care deficits   - Assess range of motion  - Assess patient's mobility; develop plan if impaired  - Assess patient's need for assistive devices and provide as appropriate  - Encourage maximum independence but intervene and supervise when necessary  - Involve family in performance of ADLs  - Assess for home care needs following discharge   - Consider OT consult to assist with ADL evaluation and planning for discharge  - Provide patient education as appropriate  Outcome: Progressing  Goal: Maintains/Returns to pre admission functional level  Description: INTERVENTIONS:  - Perform BMAT or MOVE assessment daily    - Set and communicate daily mobility goal to care team and patient/family/caregiver  - Collaborate with rehabilitation services on mobility goals if consulted  - Perform Range of Motion 3 times a day  - Reposition patient every 2 hours  - Dangle patient 3 times a day  - Stand patient 3 times a day  - Ambulate patient 3 times a day  - Out of bed to chair 3 times a day   - Out of bed for meals 3 times a day  - Out of bed for toileting  - Record patient progress and toleration of activity level   Outcome: Progressing     Problem: DISCHARGE PLANNING  Goal: Discharge to home or other facility with appropriate resources  Description: INTERVENTIONS:  - Identify barriers to discharge w/patient and caregiver  - Arrange for needed discharge resources and transportation as appropriate  - Identify discharge learning needs (meds, wound care, etc )  - Arrange for interpretive services to assist at discharge as needed  - Refer to Case Management Department for coordinating discharge planning if the patient needs post-hospital services based on physician/advanced practitioner order or complex needs related to functional status, cognitive ability, or social support system  Outcome: Progressing     Problem: Knowledge Deficit  Goal: Patient/family/caregiver demonstrates understanding of disease process, treatment plan, medications, and discharge instructions  Description: Complete learning assessment and assess knowledge base    Interventions:  - Provide teaching at level of understanding  - Provide teaching via preferred learning methods  Outcome: Progressing

## 2023-05-11 NOTE — UTILIZATION REVIEW
Initial Clinical Review    Admission: Date/Time/Statement:   Admission Orders (From admission, onward)     Ordered        05/10/23 0739  INPATIENT ADMISSION  Once                      Orders Placed This Encounter   Procedures   • INPATIENT ADMISSION     Standing Status:   Standing     Number of Occurrences:   1     Order Specific Question:   Level of Care     Answer:   Med Surg [16]     Order Specific Question:   Estimated length of stay     Answer:   More than 2 Midnights     Order Specific Question:   Certification     Answer:   I certify that inpatient services are medically necessary for this patient for a duration of greater than two midnights  See H&P and MD Progress Notes for additional information about the patient's course of treatment  ED Arrival Information     Expected   -    Arrival   5/10/2023 04:14    Acuity   Urgent            Means of arrival   Ambulance    Escorted by   Unknown    Service   Hospitalist    Admission type   Emergency            Arrival complaint   Respiratory           Chief Complaint   Patient presents with   • Asthma     Pt arrives via EMS c/o an asthma attack starting tonight  Pt recently diagnosed w/ bronchitis  Pt reports sudden onset of wheezing/ SOB  Used rescue inhaler w/ no relief  Given 2 duo nebs from EMS  Initial Presentation: 41 y o  male to the ED from home  Via EMS with complaints of shortness of breath  H/O asthma, bipolar  Arrives tachycardic, tachypneic, hypoxic  Wheezing heard in lungs  CTa chest shows: opacities suspicious for multifocal infection  Started on IV mag, solumedrol, duonebs in the ED without much improvement  Has elevated troponin  CTa pe neg for PE  Started on ASA  Heparin drip started  cardiology consult  Placed on [facility]  Cardiology consult: High sensitivity troponin peak 732  Shortness of breath, chest heaviness concerning for angina  EKG shows NOnspecific st abnormality  Check ECHo  Continue Heparin drip  Check ECHo   Monitor tele          ED Triage Vitals [05/10/23 0236]   Temperature Pulse Respirations Blood Pressure SpO2   98 3 °F (36 8 °C) (!) 117 (!) 24 137/89 (!) 87 %      Temp Source Heart Rate Source Patient Position - Orthostatic VS BP Location FiO2 (%)   Oral Monitor Sitting Right arm --      Pain Score       No Pain          Wt Readings from Last 1 Encounters:   05/11/23 (!) 140 kg (308 lb 10 3 oz)     Additional Vital Signs:   Date/Time Temp Pulse Resp BP MAP (mmHg) SpO2 Calculated FIO2 (%) - Nasal Cannula Nasal Cannula O2 Flow Rate (L/min) O2 Device Patient Position - Orthostatic VS   05/11/23 11:14:16 -- 76 18 130/73 92 90 % -- -- -- --   05/11/23 1106 -- -- -- -- -- 91 % -- -- -- --   05/11/23 1000 -- -- -- -- -- 90 % -- -- None (Room air) --   05/11/23 0746 -- -- -- -- -- 92 % 28 2 L/min Nasal cannula --   05/11/23 07:16:47 -- 77 18 153/93 113 89 % Abnormal  -- -- -- --   05/11/23 0300 -- 67 -- 133/81 -- 91 % 28 2 L/min Nasal cannula --   05/11/23 02:02:43 -- 80 20 133/81 98 90 % 28 2 L/min Nasal cannula --   05/11/23 0016 -- 79 -- 160/107 Abnormal  125 92 % -- -- -- --   05/10/23 23:22:02 98 8 °F (37 1 °C) 94 20 160/102 Abnormal  121 94 % -- -- Nasal cannula Lying   05/10/23 1935 -- -- -- -- -- -- -- -- None (Room air) --   05/10/23 19:19:59 97 9 °F (36 6 °C) 83 16 140/85 103 89 % Abnormal  -- -- -- Sitting   05/10/23 1908 97 9 °F (36 6 °C) -- -- -- -- -- -- -- -- --   05/10/23 16:34:16 -- 93 18 143/104 Abnormal  117 95 % -- -- -- --   05/10/23 1524 -- 85 -- 140/84 -- -- -- -- -- --   05/10/23 1306 -- 81 22 -- -- 92 % 32 3 L/min Nasal cannula --   05/10/23 1100 -- 91 19 140/84 107 93 % 32 3 L/min Nasal cannula Lying   05/10/23 0911 98 6 °F (37 °C) 93 20 146/82 103 91 % 32 3 L/min Nasal cannula Lying   05/10/23 0615 -- 101 21 -- -- 93 % -- -- -- --   05/10/23 0430 -- 112 Abnormal  23 Abnormal  117/70 89 93 % 28 2 L/min Nasal cannula Sitting   05/10/23 0417 -- -- -- -- -- 93 % 32 3 L/min Nasal cannula --   05/10/23 0415 98 3 °F (36 8 °C) 117 Abnormal  24 Abnormal  137/89 -- 87 % Abnormal  -- -- None (Room air) Sitting       Pertinent Labs/Diagnostic Test Results:   5/10 ECHO:Left Ventricle: Left ventricular cavity size is normal  Wall thickness is normal  The left ventricular ejection fraction is 60%  Systolic function is normal  Wall motion is normal  Diastolic function is normal   •  Left Atrium: The atrium is mildly dilated  •  Right Atrium: The atrium is mildly dilated  •  Mitral Valve: There is annular calcification  There is at least moderate regurgitation  Possible restriction of the posterior leaflet  There is mild stenosis  Consider further evaluation with ZIYAD  •  IVC/SVC: The inferior vena cava is dilated  5/10 EKG: Normal sinus rhythm  Biatrial enlargement  ST depression, consider subendocardial injury  Abnormal ECG  When compared with ECG of 10-MAY-2023 05:51, (unconfirmed)  No significant change was found    CTA ED chest PE Study   Final Result by Toro Moody DO (05/10 0710)      No pulmonary embolism is seen  Reticulonodular opacities noted in the bilateral lungs, most prominently in the bilateral lower lobes, the inferior aspect of the right upper lobe, and in the right middle lobe  Patchy alveolar opacities are seen in the right lower lobe; suspicious for    multifocal infection in the appropriate clinical setting  Correlation with the patient's symptoms and laboratory values recommended  Shotty mediastinal and hilar lymph nodes, nonspecific, possibly reactive  Other findings as above        Workstation performed: [id number]         XR chest 1 view portable   Final Result by Octavio Padilla MD (60/31 [id number])   Mild vascular congestion      Similar to previous               Workstation performed: [id number]               Results from last 7 days   Lab Units 05/11/23  0454 05/10/23  0503 05/07/23  1506   WBC Thousand/uL 9 54 10 24* 7 73   HEMOGLOBIN g/dL 14 3 14 8 14 6   HEMATOCRIT % 44 0 45 0 44 1   PLATELETS Thousands/uL 380 368 345   NEUTROS ABS Thousands/µL  --  8 02* 5 81         Results from last 7 days   Lab Units 05/11/23  0454 05/10/23  0503 05/07/23  1506   SODIUM mmol/L 137 136 135   POTASSIUM mmol/L 3 9 3 5 3 8   CHLORIDE mmol/L 103 101 103   CO2 mmol/L 27 25 24   ANION GAP mmol/L 7 10 8   BUN mg/dL 12 15 8   CREATININE mg/dL 0 72 0 91 0 80   EGFR ml/min/1 73sq m 112 101 107   CALCIUM mg/dL 9 1 9 4 9 4     Results from last 7 days   Lab Units 05/10/23  0503   AST U/L 60*   ALT U/L 64*   ALK PHOS U/L 64   TOTAL PROTEIN g/dL 7 6   ALBUMIN g/dL 4 7   TOTAL BILIRUBIN mg/dL 0 38         Results from last 7 days   Lab Units 05/11/23  0454 05/10/23  0503 05/07/23  1506   GLUCOSE RANDOM mg/dL 118 121 95       Results from last 7 days   Lab Units 05/10/23  0503   PH CHRIS  7 334   PCO2 CHRIS mm Hg 48 6   PO2 CHRIS mm Hg 37 7   HCO3 CHRIS mmol/L 25 3   BASE EXC CHRIS mmol/L -1 2   O2 CONTENT CHRIS ml/dL 14 9   O2 HGB, VENOUS % 65 9           Results from last 7 days   Lab Units 05/10/23  1932 05/10/23  1711 05/10/23  1418 05/10/23  0912 05/10/23  0704 05/10/23  0503 05/07/23  1506   HS TNI 0HR ng/L  --   --  714*  --   --  415* 5   HS TNI 2HR ng/L  --  772*  --   --  570*  --   --    HSTNI D2 ng/L  --  58*  --   --  155*  --   --    HS TNI 4HR ng/L 603*  --   --  607*  --   --   --    HSTNI D4 ng/L -111  --   --  192*  --   --   --          Results from last 7 days   Lab Units 05/11/23  0648 05/10/23  2211 05/10/23  1526 05/10/23  0503   PROTIME seconds  --   --   --  13 3   INR   --   --   --  1 03   PTT seconds 43* 39* 29 25         Results from last 7 days   Lab Units 05/10/23  0737 05/10/23  0503   LACTIC ACID mmol/L 1 8 2 8*       Results from last 7 days   Lab Units 05/10/23  0503   BNP pg/mL 127*             Results from last 7 days   Lab Units 05/10/23  0737   BLOOD CULTURE  Received in Microbiology Lab  Culture in Progress  Received in Microbiology Lab  Culture in Progress                 ED Treatment:   Medication Administration from 05/10/2023 0414 to 05/10/2023 1631       Date/Time Order Dose Route Action Comments     05/10/2023 0445 EDT ipratropium-albuterol (DUO-NEB) 0 5-2 5 mg/3 mL inhalation solution 3 mL 3 mL Nebulization Given --     05/10/2023 0455 EDT methylPREDNISolone sodium succinate (Solu-MEDROL) injection 80 mg 80 mg Intravenous Given --     05/10/2023 0642 EDT ipratropium-albuterol (DUO-NEB) 0 5-2 5 mg/3 mL inhalation solution 3 mL 3 mL Nebulization Given --     05/10/2023 0459 EDT magnesium sulfate 2 g/50 mL IVPB (premix) 2 g 2 g Intravenous New Bag --     05/10/2023 0750 EDT ceftriaxone (ROCEPHIN) 2 g/50 mL in dextrose IVPB 2,000 mg Intravenous New Bag --     05/10/2023 0751 EDT sodium chloride 0 9 % bolus 1,000 mL 1,000 mL Intravenous New Bag --     05/10/2023 0732 EDT ipratropium-albuterol (DUO-NEB) 0 5-2 5 mg/3 mL inhalation solution 3 mL 3 mL Nebulization Given --     05/10/2023 0750 EDT aspirin chewable tablet 324 mg 324 mg Oral Given --     05/10/2023 0853 EDT heparin (porcine) injection 4,000 Units 4,000 Units Intravenous Given --     05/10/2023 0912 EDT heparin (porcine) 25,000 units in 0 45% NaCl 250 mL infusion (premix) 11 1 Units/kg/hr Intravenous New Bag --     05/10/2023 1549 EDT heparin (porcine) injection 4,000 Units 4,000 Units Intravenous Given --     05/10/2023 1005 EDT carBAMazepine (TEGretol XR) 12 hr tablet 400 mg 400 mg Oral Given --     05/10/2023 0915 EDT levETIRAcetam (KEPPRA) tablet 500 mg 500 mg Oral Given --     05/10/2023 1404 EDT acetaminophen (TYLENOL) tablet 650 mg 650 mg Oral Given --     05/10/2023 0916 EDT calcium carbonate (TUMS) chewable tablet 500 mg 500 mg Oral Given --     05/10/2023 1318 EDT methylPREDNISolone sodium succinate (Solu-MEDROL) injection 40 mg 40 mg Intravenous Given --     05/10/2023 1406 EDT levalbuterol (XOPENEX) inhalation solution 1 25 mg 1 25 mg Nebulization Given --     05/10/2023 1406 EDT ipratropium (ATROVENT) 0 02 % inhalation solution 0 5 mg 0 5 mg Nebulization Given --     05/10/2023 1533 EDT aluminum-magnesium hydroxide-simethicone (MYLANTA) oral suspension 30 mL 30 mL Oral Given --        Past Medical History:   Diagnosis Date   • Asthma    • Seizure ([facility] [address] )        Admitting Diagnosis: Pneumonia [J18 9]  Asthma [J45 909]  Elevated troponin [R77 8]  Asthma exacerbation [J45 901]  Acute respiratory failure with hypoxemia (HCC) [J96 01]  Chest pain, unspecified type [R07 9]  Mild asthma, unspecified whether complicated, unspecified whether persistent [J45 909]  Age/Sex: 41 y o  male  Admission Orders:  Up and OOB  SCDS   heparin drip with routine ptt  Scheduled Medications:  aspirin, 81 mg, Oral, Daily  carBAMazepine, 400 mg, Oral, BID  diazepam, 10 mg, Oral, HS  furosemide, 40 mg, Intravenous, BID (diuretic)  ipratropium, 0 5 mg, Nebulization, TID  levalbuterol, 1 25 mg, Nebulization, TID  levETIRAcetam, 500 mg, Oral, Q12H [address]  magnesium Oxide, 400 mg, Oral, BID  methylPREDNISolone sodium succinate, 40 mg, Intravenous, Q8H ROGER  mirtazapine, 15 mg, Oral, HS  nicotine, 1 patch, Transdermal, Daily      Continuous IV Infusions:  heparin (porcine), 3-20 Units/kg/hr (Order-Specific), Intravenous, Titrated      PRN Meds:  acetaminophen, 650 mg, Oral, Q6H PRN  aluminum-magnesium hydroxide-simethicone, 30 mL, Oral, Q4H PRN  heparin (porcine), 2,000 Units, Intravenous, Q6H PRN  heparin (porcine), 4,000 Units, Intravenous, Q6H PRN  levalbuterol, 1 25 mg, Nebulization, Q8H PRN        IP CONSULT TO CASE MANAGEMENT  IP CONSULT TO CARDIOLOGY  IP CONSULT TO PSYCHIATRY    Network Utilization Review Department  ATTENTION: Please call with any questions or concerns to [phone] and carefully listen to the prompts so that you are directed to the right person   All voicemails are confidential   Jacinta Dickerson all requests for admission clinical reviews, approved or denied determinations and any other requests to dedicated fax number below belonging to the [facility] where the patient is receiving treatment   List of dedicated fax numbers for the Facilities:  [address] [place] [address] DENIALS (Administrative/Medical Necessity) [phone]   [address]  (Maternity/NICU/Pediatrics) [phone]   [address] [phone]   [address] [phone]   [address] [place] [address] [phone]   [facility] 815 [facility] [phone]

## 2023-05-11 NOTE — ASSESSMENT & PLAN NOTE
· Troponin continuing to uptrend at this time; continue to trend until peak  · EKG non-ischemic   · Reporting chest tightness as above   · CTA PE study negative for any pulmonary embolism  · Given full dose ASA  · Continue heparin drip  · Cardiology consulted

## 2023-05-11 NOTE — PROGRESS NOTES
"Cardiology Progress Note - Dirk Julian 41 y o  male MRN: [id number]    Unit/Bed#: -01 Encounter: [id number]      Assessment/Plan:  1  Nonischemic myocardial injury  • High sensitivity troponin peak 732  • Patient endorses shortness or breath, chest heaviness, concern for anginal equivalent  • ECG on admission showed ST, nonspecific ST abnormality  • Patient underwent cardiac catheterization which revealed no significant epicardial coronary artery disease      2  Moderate MR  • TTE this admission showed at least moderate MR, possible restriction of the posterior leaflet, mild stenosis  • Recommend further evaluation with ZIYAD  Risk/benefits discussed with the patient  Plan for ZIYAD tomorrow  Patient agreeable to procedure      3  Shortness of breath  • Patient endorses progressive shortness of breath and orthopnea  • Patient denies any improvement with nebulizer treatments  • Patient appears hypervolemic  • Start Lasix 40 mg IV twice daily  • TTE ordered  • Maintain strict I/O's, daily weights, and low sodium diet      3  Asthma  • Currently receiving nebulizer treatments  Management per primary team      4  Tobacco abuse  • Cessation advised      5  Bipolar 1 disorder/PTSD/TRUPTI  · Patient has been off of her medication for 2 weeks  · Psychiatry consult placed    6  Seizure    Subjective:   Patient seen and examined  No significant events overnight  Patient notes that she has not been taking her bipolar medication for 2 weeks  Notes increased anxiety and continues to endorse dyspnea on exertion  Objective:     Vitals: Blood pressure 141/77, pulse 91, temperature 98 8 °F (37 1 °C), temperature source Oral, resp  rate 18, height 5' 6\" (1 676 m), weight (!) 140 kg (308 lb 10 3 oz), SpO2 92 %  , Body mass index is 49 82 kg/m² ,   Orthostatic Blood Pressures    Flowsheet Row Most Recent Value   Blood Pressure 141/77 filed at 05/11/2023 1430   Patient Position - Orthostatic VS Lying filed at " 05/10/2023 2322            Intake/Output Summary (Last 24 hours) at 5/11/2023 1518  Last data filed at 5/11/2023 1041  Gross per 24 hour   Intake 82 31 ml   Output 2325 ml   Net -2242 69 ml         Physical Exam:  Physical Exam  Vitals and nursing note reviewed  Constitutional:       General: He is not in acute distress  Appearance: He is well-developed  He is obese  HENT:      Head: Normocephalic and atraumatic  Eyes:      Conjunctiva/sclera: Conjunctivae normal    Neck:      Vascular: No carotid bruit  Cardiovascular:      Rate and Rhythm: Normal rate and regular rhythm  Heart sounds: Normal heart sounds  No murmur heard  Pulmonary:      Effort: Pulmonary effort is normal  No respiratory distress  Breath sounds: Decreased breath sounds present  Abdominal:      Palpations: Abdomen is soft  Tenderness: There is no abdominal tenderness  Musculoskeletal:         General: No swelling  Cervical back: Neck supple  Right lower leg: No edema  Left lower leg: No edema  Skin:     General: Skin is warm and dry  Capillary Refill: Capillary refill takes less than 2 seconds  Neurological:      Mental Status: He is alert and oriented to person, place, and time     Psychiatric:         Mood and Affect: Mood normal               Medications:      Current Facility-Administered Medications:   •  acetaminophen (TYLENOL) tablet 650 mg, 650 mg, Oral, Q6H PRN, Bonnita Bream, DO, 650 mg at 05/10/23 1404  •  aluminum-magnesium hydroxide-simethicone (MYLANTA) oral suspension 30 mL, 30 mL, Oral, Q4H PRN, Bonnita Bream, DO, 30 mL at 05/10/23 1533  •  aspirin chewable tablet 81 mg, 81 mg, Oral, Daily, Donavan Aguila MD, 81 mg at 05/11/23 0816  •  carBAMazepine (TEGretol XR) 12 hr tablet 400 mg, 400 mg, Oral, BID, Bonnita Bream, DO, 400 mg at 05/11/23 0919  •  diazepam (VALIUM) tablet 10 mg, 10 mg, Oral, HS, Bonnita Bream, DO, 10 mg at 05/10/23 2125  •  furosemide (LASIX) injection 40 mg, 40 mg, Intravenous, BID (diuretic), Jerrica Cooley PA-C, 40 mg at 05/11/23 0816  •  heparin (porcine) 25,000 units in 0 45% NaCl 250 mL infusion (premix), 3-20 Units/kg/hr (Order-Specific), Intravenous, Titrated, Reida Jerry, DO, Stopped at 05/11/23 1225  •  heparin (porcine) injection 2,000 Units, 2,000 Units, Intravenous, Q6H PRN, Reida Jerry, DO, 2,000 Units at 05/11/23 0820  •  heparin (porcine) injection 4,000 Units, 4,000 Units, Intravenous, Q6H PRN, Reida Jerry, DO, 4,000 Units at 05/10/23 2324  •  ipratropium (ATROVENT) 0 02 % inhalation solution 0 5 mg, 0 5 mg, Nebulization, TID, Reida Jerry, DO, 0 5 mg at 05/11/23 1355  •  levalbuterol (XOPENEX) inhalation solution 1 25 mg, 1 25 mg, Nebulization, Q8H PRN, Reida Jerry, DO  •  levalbuterol (XOPENEX) inhalation solution 1 25 mg, 1 25 mg, Nebulization, TID, Reida Jerry, DO, 1 25 mg at 05/11/23 1354  •  levETIRAcetam (KEPPRA) tablet 500 mg, 500 mg, Oral, Q12H [address], Reida Jerry, DO, 500 mg at 05/11/23 0816  •  magnesium Oxide (MAG-OX) tablet 400 mg, 400 mg, Oral, BID, Evorn Budd, CRNP, 400 mg at 05/11/23 1136  •  methylPREDNISolone sodium succinate (Solu-MEDROL) injection 40 mg, 40 mg, Intravenous, Q8H [address], Reida Jerry, DO, 40 mg at 05/11/23 0537  •  mirtazapine (REMERON) tablet 15 mg, 15 mg, Oral, HS, Reida Jerry, DO, 15 mg at 05/10/23 2124  •  nicotine (NICODERM CQ) 14 mg/24hr TD 24 hr patch 1 patch, 1 patch, Transdermal, Daily, Reida Jerry, DO  •  sodium chloride 0 9 % infusion, 75 mL/hr, Intravenous, Continuous, Ermelinda Market, CRNP, Last Rate: 75 mL/hr at 05/11/23 1236, 75 mL/hr at 05/11/23 1236     Labs & Results:     Results from last 7 days   Lab Units 05/10/23  1932 05/10/23  1711 05/10/23  1418 05/10/23  0912 05/10/23  0704 05/10/23  0503 05/07/23  1506   HS TNI 0HR ng/L  --   --  714*  --   --  415* 5   HS TNI 2HR ng/L  --  772*  --   --  570*  --   --    HSTNI D2 ng/L "--  58*  --   --  155*  --   --    HS TNI 4HR ng/L 603*  --   --  607*  --   --   --    HSTNI D4 ng/L -111  --   --  192*  --   --   --      Results from last 7 days   Lab Units 05/11/23  0454 05/10/23  0503 05/07/23  1506   WBC Thousand/uL 9 54 10 24* 7 73   HEMOGLOBIN g/dL 14 3 14 8 14 6   HEMATOCRIT % 44 0 45 0 44 1   PLATELETS Thousands/uL 380 368 345         Results from last 7 days   Lab Units 05/11/23  0454 05/10/23  0503 05/07/23  1506   POTASSIUM mmol/L 3 9 3 5 3 8   CHLORIDE mmol/L 103 101 103   CO2 mmol/L 27 25 24   BUN mg/dL 12 15 8   CREATININE mg/dL 0 72 0 91 0 80   CALCIUM mg/dL 9 1 9 4 9 4   ALK PHOS U/L  --  64  --    ALT U/L  --  64*  --    AST U/L  --  60*  --      Results from last 7 days   Lab Units 05/11/23  0648 05/10/23  2211 05/10/23  1526 05/10/23  0503   INR   --   --   --  1 03   PTT seconds 43* 39* 29 25           Vitals: Blood pressure 141/77, pulse 91, temperature 98 8 °F (37 1 °C), temperature source Oral, resp  rate 18, height 5' 6\" (1 676 m), weight (!) 140 kg (308 lb 10 3 oz), SpO2 92 %  , Body mass index is 49 82 kg/m² ,   Orthostatic Blood Pressures    Flowsheet Row Most Recent Value   Blood Pressure 141/77 filed at 05/11/2023 1430   Patient Position - Orthostatic VS Lying filed at 05/10/2023 5493          Systolic (13NMD), KJT:713 , Min:130 , DYA:440     Diastolic (74NKB), OFU:35, Min:73, Max:107        Intake/Output Summary (Last 24 hours) at 5/11/2023 1518  Last data filed at 5/11/2023 1041  Gross per 24 hour   Intake 82 31 ml   Output 2325 ml   Net -2242 69 ml       Invasive Devices     Peripheral Intravenous Line  Duration           Peripheral IV 05/10/23 Left Wrist 1 day    Peripheral IV 05/10/23 Right Antecubital 1 day                      Telemetry:  Telemetry Orders (From admission, onward)             48 Hour Telemetry monitoring  Continuous x 48 hours        References:    Telemetry Guidelines   Question:  Reason for 48 Hour Telemetry  Answer:  Acute MI, chest pain - " R/O MI, or unstable angina                     BP Readings from Last 3 Encounters:   05/11/23 141/77   05/07/23 137/72      Wt Readings from Last 3 Encounters:   05/11/23 (!) 140 kg (308 lb 10 3 oz)   05/07/23 (!) 136 kg (300 lb 4 3 oz)

## 2023-05-11 NOTE — NUTRITION
05/11/23 1358   Biochemical Data,Medical Tests, and Procedures   Biochemical Data/Medical Tests/Procedures Lab values reviewed; Meds reviewed   Labs (Comment) Labs reviewed - WNL   Meds (Comment) lasix, magnesium oxide, remeron, NaCl infusion   Nutrition-Focused Physical Exam   Nutrition-Focused Physical Exam Findings RN skin assessment reviewed;Edema   Nutrition-Focused Physical Exam Findings generalized edema   Medical-Related Concerns obesity, CHF, HTN, active smoker   Current PO Intake   Current Diet Order Cardiac diet, 2 g Na   Current Meal Intake Suboptimal, but improving   Estimated calorie intake compared to estimated need Per pt, diet recently initiated  Intake suboptimal but improving  Pt reports eating lunch  PES Statement   Knowledge and Beliefs (1) Food- and nutrition-related knowledge deficit NB-1 1   Related to Other (Comment)  (Low Sodium diet)   As evidenced by: Per patient/family interview   Recommendations/Interventions   Adult BMI Classifications Morbid Obesity 45-49 9   Summary High BMI 49 82  Presents with SOB and report of indigestion  Past medical history significant for obesity, CHF, HTN, active smoker  Weight history unavailable as chart is marked for merge  Generalized edema noted  Ordered for Cardiac diet with 2 g Na restriction  Pt reports appetite as alright  Works a 12AM-8AM shift - diet is impacted by work schedule  Has 2 meals daily  Does not add salt to food  Pt's wife and aunt cook and grocery shop  No difficulty chewing or swallowing  NKFA  Pt states weight fluctuates with fluid status and food choices  Reports dairy products, fried foods, spaghetti sauce all result in GI upset  Pt states they are trying to make healthful changes - plan to quit smoking and make healthier diet choices  Pt has questions about Cardiac diet  RD provided verbal low sodium diet education  Pt verbalizes understanding  RD on site to give physical handout     Interventions/Recommendations Continue current diet order   Education Assessment   Education Education initiated/ completed   Patient Nutrition Goals   Goal Comprehend education; Adequate hydration; Adequate intake

## 2023-05-11 NOTE — PROGRESS NOTES
[facility]  Progress Note  Name: Kevin Willams  MRN: [id number]  Unit/Bed#: -01 I Date of Admission: 5/10/2023   Date of Service: 5/11/2023 I Hospital Day: 1    Assessment/Plan   * Acute respiratory failure ([place] [address] )  Assessment & Plan  · Was 87% O2 RA while in ED  · Currently requiring 2L NC   · Suspected to be in setting of acute asthma exacerbation and heart failure exacerbation  · See tx of asthma as above  · Wean supplemental O2 as able     Acute exacerbation of congestive heart failure ([place] [address] )  Assessment & Plan  Wt Readings from Last 3 Encounters:   05/11/23 (!) 140 kg (308 lb 10 3 oz)   05/07/23 (!) 136 kg (300 lb 4 3 oz)     Acute exacerbation of heart failure with preserved EF  Patient is experiencing shortness of breath with orthopnea  Cardiology following  IV Lasix  Follow-up echocardiogram    Chest pain  Assessment & Plan  · Patient complaining of left-sided chest pain and indigestion  · JAYDON score of 2  · Troponin peaked at 772  · EKG non-ischemic   · CTA PE study negative for any pulmonary embolism  · Cardiology consulted  · Continue heparin drip  · Plan for cardiac cath  · Follow-up echocardiogram    Asthma  Assessment & Plan  · Patient with wheezing noted  · Seen in ED on 05/07 w/ asthma exacerbation; improved w/nebs, discharged on PO prednisone and Z-dayana  · Requiring 3L NC at this time  · CXR indicative of vascular congestion  · After IV solumedrol 80mg, duo-neb, and 2g IV mag in ED reports feeling somewhat improved, however still symptomatic  · Scheduled IV steroids and neb treatments     Morbid obesity with BMI of 45 0-49 9, adult ([place] [address] )  Assessment & Plan  Counseled on lifestyle modifications    Tobacco abuse  Assessment & Plan  · Discussed importance of smoking cessation for greater than 3 minutes  · Nicotine patch ordered    Elevated troponin  Assessment & Plan  · Troponin continuing to uptrend at this time; continue to trend until peak  · EKG non-ischemic · Reporting chest tightness as above   · CTA PE study negative for any pulmonary embolism  · Given full dose ASA  · Continue heparin drip  · Cardiology consulted    Seizure [facility])  Assessment & Plan  · Continue home keppra 500mg BID and tegretol 400mg BID          VTE Pharmacologic Prophylaxis: VTE Score: 3 Moderate Risk (Score 3-4) - Pharmacological DVT Prophylaxis Ordered: heparin drip  Patient Centered Rounds: I performed bedside rounds with nursing staff today  Discussions with Specialists or Other Care Team Provider: Reviewed cardiology notes reviewed previous providers notes discussed with case management primary RN    Education and Discussions with Family / Patient: Discussed with patient and family    Total Time Spent on Date of Encounter in care of patient: 35 minutes This time was spent on one or more of the following: performing physical exam; counseling and coordination of care; obtaining or reviewing history; documenting in the medical record; reviewing/ordering tests, medications or procedures; communicating with other healthcare professionals and discussing with patient's family/caregivers  Current Length of Stay: 1 day(s)  Current Patient Status: Inpatient   Certification Statement: The patient will continue to require additional inpatient hospital stay due to Continued heparin drip, continued IV Lasix, cardiac cath, following up echocardiogram, continued IV steroids  Discharge Plan: Anticipate discharge in 24-48 hrs to home  Code Status: Level 1 - Full Code    Subjective: Wife is at bedside wife and patient are frustrated stating that they do not feel like they are getting updated sufficiently I provided them with an extensive update of entire hospital course verbalized understanding patient is still complaining of dyspnea with exertion orthopnea reports that leg swelling has improved    Patient and wife are recent transplants from [place] and do not have any insurance they are trying to establish care here Case management is aware    Objective:     Vitals:   Temp (24hrs), Av 2 °F (36 8 °C), Min:97 9 °F (36 6 °C), Max:98 8 °F (37 1 °C)    Temp:  [97 9 °F (36 6 °C)-98 8 °F (37 1 °C)] 98 8 °F (37 1 °C)  HR:  [67-94] 77  Resp:  [16-22] 18  BP: (133-160)/() 153/93  SpO2:  [89 %-95 %] 92 %  Body mass index is 49 82 kg/m²  Input and Output Summary (last 24 hours): Intake/Output Summary (Last 24 hours) at 2023 0914  Last data filed at 5/10/2023 2259  Gross per 24 hour   Intake 1132 31 ml   Output 1500 ml   Net -367 69 ml       Physical Exam:   Physical Exam  Vitals and nursing note reviewed  Cardiovascular:      Rate and Rhythm: Normal rate  Pulmonary:      Effort: Pulmonary effort is normal       Breath sounds: Rhonchi present  Abdominal:      Palpations: Abdomen is soft  Skin:     General: Skin is warm  Neurological:      Mental Status: He is alert  Mental status is at baseline     Psychiatric:         Mood and Affect: Mood normal           Additional Data:     Labs:  Results from last 7 days   Lab Units 23  0454 05/10/23  0503   WBC Thousand/uL 9 54 10 24*   HEMOGLOBIN g/dL 14 3 14 8   HEMATOCRIT % 44 0 45 0   PLATELETS Thousands/uL 380 368   NEUTROS PCT %  --  79*   LYMPHS PCT %  --  11*   MONOS PCT %  --  10   EOS PCT %  --  0     Results from last 7 days   Lab Units 23  0454 05/10/23  0503   SODIUM mmol/L 137 136   POTASSIUM mmol/L 3 9 3 5   CHLORIDE mmol/L 103 101   CO2 mmol/L 27 25   BUN mg/dL 12 15   CREATININE mg/dL 0 72 0 91   ANION GAP mmol/L 7 10   CALCIUM mg/dL 9 1 9 4   ALBUMIN g/dL  --  4 7   TOTAL BILIRUBIN mg/dL  --  0 38   ALK PHOS U/L  --  64   ALT U/L  --  64*   AST U/L  --  60*   GLUCOSE RANDOM mg/dL 118 121     Results from last 7 days   Lab Units 05/10/23  0503   INR  1 03             Results from last 7 days   Lab Units 05/10/23  0737 05/10/23  0503   LACTIC ACID mmol/L 1 8 2 8*       Lines/Drains:  Invasive Devices     Peripheral Intravenous Line  Duration           Peripheral IV 05/10/23 Left Wrist 1 day    Peripheral IV 05/10/23 Right Antecubital 1 day                  Telemetry:  Telemetry Orders (From admission, onward)             48 Hour Telemetry monitoring  Continuous x 48 hours        References:    Telemetry Guidelines   Question:  Reason for 48 Hour Telemetry  Answer:  Acute MI, chest pain - R/O MI, or unstable angina                 Telemetry Reviewed: Normal Sinus Rhythm  Indication for Continued Telemetry Use: Awaiting PCI/EP Study/CABG      Recent Cultures (last 7 days):   Results from last 7 days   Lab Units 05/10/23  0737   BLOOD CULTURE  Received in Microbiology Lab  Culture in Progress  Received in Microbiology Lab  Culture in Progress         Last 24 Hours Medication List:   Current Facility-Administered Medications   Medication Dose Route Frequency Provider Last Rate   • acetaminophen  650 mg Oral Q6H PRN Faisal Cheese, DO     • aluminum-magnesium hydroxide-simethicone  30 mL Oral Q4H PRN Faisal Cheese, DO     • aspirin  81 mg Oral Daily Yvette Stone MD     • carBAMazepine  400 mg Oral BID Faisal Cheese, DO     • diazepam  10 mg Oral HS Faisal Cheese, DO     • furosemide  40 mg Intravenous BID (diuretic) Wicho Marin PA-C     • heparin (porcine)  3-20 Units/kg/hr (Order-Specific) Intravenous Titrated Faisal Cheese, DO 21 1 Units/kg/hr (05/11/23 5680)   • heparin (porcine)  2,000 Units Intravenous Q6H PRN Faisal Cheese, DO     • heparin (porcine)  4,000 Units Intravenous Q6H PRN Faisal Cheese, DO     • ipratropium  0 5 mg Nebulization TID Faisal Cheese, DO     • levalbuterol  1 25 mg Nebulization Q8H PRN Faisal Cheese, DO     • levalbuterol  1 25 mg Nebulization TID Faisal Cheese, DO     • levETIRAcetam  500 mg Oral Q12H [facility] Faisal Cheese, DO     • methylPREDNISolone sodium succinate  40 mg Intravenous [facility] Faisal Cheese, DO     • mirtazapine  15 mg Oral HS Ferol Mighty DO Birana     • nicotine  1 patch Transdermal Daily Papa Costello DO          Today, Patient Was Seen By: JOSE Richards    **Please Note: This note may have been constructed using a voice recognition system  **

## 2023-05-11 NOTE — ASSESSMENT & PLAN NOTE
Wt Readings from Last 3 Encounters:   05/11/23 (!) 140 kg (308 lb 10 3 oz)   05/07/23 (!) 136 kg (300 lb 4 3 oz)     Acute exacerbation of heart failure with preserved EF  Patient is experiencing shortness of breath with orthopnea  Cardiology following  IV Lasix  Follow-up echocardiogram

## 2023-05-12 LAB
ANION GAP SERPL CALCULATED.3IONS-SCNC: 7 MMOL/L (ref 4–13)
BUN SERPL-MCNC: 18 MG/DL (ref 5–25)
CALCIUM SERPL-MCNC: 9.7 MG/DL (ref 8.4–10.2)
CHLORIDE SERPL-SCNC: 100 MMOL/L (ref 96–108)
CHOLEST SERPL-MCNC: 146 MG/DL
CO2 SERPL-SCNC: 30 MMOL/L (ref 21–32)
CREAT SERPL-MCNC: 0.81 MG/DL (ref 0.6–1.3)
ERYTHROCYTE [DISTWIDTH] IN BLOOD BY AUTOMATED COUNT: 15.7 % (ref 11.6–15.1)
GFR SERPL CREATININE-BSD FRML MDRD: 107 ML/MIN/1.73SQ M
GLUCOSE SERPL-MCNC: 107 MG/DL (ref 65–140)
HCT VFR BLD AUTO: 46.8 % (ref 36.5–49.3)
HDLC SERPL-MCNC: 39 MG/DL
HGB BLD-MCNC: 15.3 G/DL (ref 12–17)
LDLC SERPL CALC-MCNC: 77 MG/DL (ref 0–100)
MCH RBC QN AUTO: 28.7 PG (ref 26.8–34.3)
MCHC RBC AUTO-ENTMCNC: 32.7 G/DL (ref 31.4–37.4)
MCV RBC AUTO: 88 FL (ref 82–98)
PLATELET # BLD AUTO: 415 THOUSANDS/UL (ref 149–390)
PMV BLD AUTO: 8.8 FL (ref 8.9–12.7)
POTASSIUM SERPL-SCNC: 3.8 MMOL/L (ref 3.5–5.3)
RBC # BLD AUTO: 5.33 MILLION/UL (ref 3.88–5.62)
SODIUM SERPL-SCNC: 137 MMOL/L (ref 135–147)
TRIGL SERPL-MCNC: 149 MG/DL
WBC # BLD AUTO: 9.47 THOUSAND/UL (ref 4.31–10.16)

## 2023-05-12 RX ORDER — FUROSEMIDE 40 MG/1
40 TABLET ORAL DAILY
Status: DISCONTINUED | OUTPATIENT
Start: 2023-05-13 | End: 2023-05-13 | Stop reason: HOSPADM

## 2023-05-12 RX ORDER — PRAZOSIN HYDROCHLORIDE 2 MG/1
2 CAPSULE ORAL DAILY
Status: DISCONTINUED | OUTPATIENT
Start: 2023-05-12 | End: 2023-05-13 | Stop reason: HOSPADM

## 2023-05-12 RX ORDER — HYDROXYZINE HYDROCHLORIDE 10 MG/1
10 TABLET, FILM COATED ORAL EVERY 12 HOURS PRN
Status: DISCONTINUED | OUTPATIENT
Start: 2023-05-12 | End: 2023-05-13 | Stop reason: HOSPADM

## 2023-05-12 RX ORDER — OLANZAPINE 5 MG/1
5 TABLET, ORALLY DISINTEGRATING ORAL DAILY
Status: DISCONTINUED | OUTPATIENT
Start: 2023-05-12 | End: 2023-05-13 | Stop reason: HOSPADM

## 2023-05-12 RX ADMIN — LEVETIRACETAM 500 MG: 500 TABLET, FILM COATED ORAL at 22:32

## 2023-05-12 RX ADMIN — DIAZEPAM 10 MG: 5 TABLET ORAL at 22:32

## 2023-05-12 RX ADMIN — METHYLPREDNISOLONE SODIUM SUCCINATE 40 MG: 40 INJECTION, POWDER, FOR SOLUTION INTRAMUSCULAR; INTRAVENOUS at 14:00

## 2023-05-12 RX ADMIN — IPRATROPIUM BROMIDE 0.5 MG: 0.5 SOLUTION RESPIRATORY (INHALATION) at 14:20

## 2023-05-12 RX ADMIN — Medication 400 MG: at 17:10

## 2023-05-12 RX ADMIN — Medication 400 MG: at 08:44

## 2023-05-12 RX ADMIN — METHYLPREDNISOLONE SODIUM SUCCINATE 40 MG: 40 INJECTION, POWDER, FOR SOLUTION INTRAMUSCULAR; INTRAVENOUS at 05:47

## 2023-05-12 RX ADMIN — LEVETIRACETAM 500 MG: 500 TABLET, FILM COATED ORAL at 08:44

## 2023-05-12 RX ADMIN — OLANZAPINE 5 MG: 5 TABLET, ORALLY DISINTEGRATING ORAL at 09:38

## 2023-05-12 RX ADMIN — PRAZOSIN HYDROCHLORIDE 2 MG: 2 CAPSULE ORAL at 09:38

## 2023-05-12 RX ADMIN — ASPIRIN 81 MG: 81 TABLET, CHEWABLE ORAL at 08:44

## 2023-05-12 RX ADMIN — IPRATROPIUM BROMIDE 0.5 MG: 0.5 SOLUTION RESPIRATORY (INHALATION) at 07:29

## 2023-05-12 RX ADMIN — IPRATROPIUM BROMIDE 0.5 MG: 0.5 SOLUTION RESPIRATORY (INHALATION) at 19:10

## 2023-05-12 RX ADMIN — LEVALBUTEROL HYDROCHLORIDE 1.25 MG: 1.25 SOLUTION RESPIRATORY (INHALATION) at 07:29

## 2023-05-12 RX ADMIN — LEVALBUTEROL HYDROCHLORIDE 1.25 MG: 1.25 SOLUTION RESPIRATORY (INHALATION) at 14:20

## 2023-05-12 RX ADMIN — CARBAMAZEPINE 400 MG: 100 TABLET, EXTENDED RELEASE ORAL at 08:44

## 2023-05-12 RX ADMIN — MIRTAZAPINE 15 MG: 15 TABLET, FILM COATED ORAL at 22:32

## 2023-05-12 RX ADMIN — METHYLPREDNISOLONE SODIUM SUCCINATE 40 MG: 40 INJECTION, POWDER, FOR SOLUTION INTRAMUSCULAR; INTRAVENOUS at 22:32

## 2023-05-12 RX ADMIN — ENOXAPARIN SODIUM 40 MG: 40 INJECTION SUBCUTANEOUS at 08:44

## 2023-05-12 RX ADMIN — LEVALBUTEROL HYDROCHLORIDE 1.25 MG: 1.25 SOLUTION RESPIRATORY (INHALATION) at 19:10

## 2023-05-12 RX ADMIN — CARBAMAZEPINE 400 MG: 100 TABLET, EXTENDED RELEASE ORAL at 17:10

## 2023-05-12 RX ADMIN — FUROSEMIDE 40 MG: 10 INJECTION, SOLUTION INTRAVENOUS at 08:44

## 2023-05-12 RX ADMIN — HYDROXYZINE HYDROCHLORIDE 10 MG: 10 TABLET ORAL at 09:38

## 2023-05-12 NOTE — ASSESSMENT & PLAN NOTE
· Patient complaining of left-sided chest pain and indigestion  · JAYDON score of 2  · Troponin peaked at 772  · EKG non-ischemic   · CTA PE study negative for any pulmonary embolism  · Cardiology consulted  · Patient underwent cardiac catheterization which revealed no significant epicardial coronary artery disease    · Echocardiogram

## 2023-05-12 NOTE — RESPIRATORY THERAPY NOTE
"RT Protocol Note  Reed Gain 41 y o  male MRN: [id number]  Unit/Bed#: -01 Encounter: [id number]    Assessment    Principal Problem:    Acute respiratory failure ([facility] )  Active Problems:    Asthma    Bipolar 1 disorder ([facility] )    Elevated troponin    Chest pain    Tobacco abuse    Morbid obesity with BMI of 45 0-49 9, adult ([facility])    Acute exacerbation of congestive heart failure ([facility])      Home Pulmonary Medications:  Albuterol prn       Past Medical History:   Diagnosis Date   • Asthma    • Seizure ([place] [address] )      Social History     Socioeconomic History   • Marital status: /Civil Union     Spouse name: None   • Number of children: None   • Years of education: None   • Highest education level: None   Occupational History   • None   Tobacco Use   • Smoking status: Every Day     Types: Cigarettes   • Smokeless tobacco: Never   Vaping Use   • Vaping Use: Every day   Substance and Sexual Activity   • Alcohol use: Never   • Drug use: Never   • Sexual activity: None   Other Topics Concern   • None   Social History Narrative   • None     Social Determinants of Health     Financial Resource Strain: Not on file   Food Insecurity: Not on file   Transportation Needs: Not on file   Physical Activity: Not on file   Stress: Not on file   Social Connections: Not on file   Intimate Partner Violence: Not on file   Housing Stability: Not on file       Subjective         Objective    Physical Exam:   Assessment Type: Assess only  General Appearance: Alert, Awake  Respiratory Pattern: Dyspnea with exertion, Dyspnea at rest  Chest Assessment: Chest expansion symmetrical  Bilateral Breath Sounds: Diminished, Expiratory wheezes  O2 Device: RA    Vitals:  Blood pressure 141/86, pulse 72, temperature 98 8 °F (37 1 °C), temperature source Axillary, resp  rate 18, height 5' 6\" (1 676 m), weight (!) 140 kg (308 lb 10 3 oz), SpO2 96 %  Imaging and other studies: I have personally reviewed pertinent reports        O2 " Device: RA     Plan    Respiratory Plan: Mild Distress pathway        Resp Comments: will continue with current orders  Patient does have a pulmonary history and uses txs at home

## 2023-05-12 NOTE — PROGRESS NOTES
[address] [place] [address]  Progress Note  Name: Darrell Vazquez  MRN: [id number]  Unit/Bed#: -01 I Date of Admission: 5/10/2023   Date of Service: 5/12/2023 I Hospital Day: 2    Assessment/Plan   * Acute respiratory failure ([facility] )  Assessment & Plan  · Was 87% O2 RA while in ED  · Currently requiring 2L NC   · Suspected to be in setting of acute asthma exacerbation and heart failure exacerbation  · See tx plants below for asthma and CHF exacerbation   · Wean supplemental O2 as able   · Sats drop at night, will complete an overnight pulse ox eval    Acute exacerbation of congestive heart failure ([place] [address] )  Assessment & Plan  Wt Readings from Last 3 Encounters:   05/12/23 (!) 140 kg (308 lb 6 8 oz)   05/07/23 (!) 136 kg (300 lb 4 3 oz)     · Acute exacerbation of heart failure with preserved EF  · Patient is experiencing shortness of breath with orthopnea  · Cardiology following  · IV Lasix BID  · Echocardiogram revealed moderate MR  · ZIYAD    Chest pain  Assessment & Plan  · Patient complaining of left-sided chest pain and indigestion  · JAYDON score of 2  · Troponin peaked at 772  · EKG non-ischemic   · CTA PE study negative for any pulmonary embolism  · Cardiology consulted  · Patient underwent cardiac catheterization which revealed no significant epicardial coronary artery disease  · Echocardiogram    Elevated troponin  Assessment & Plan  · Troponin continuing to uptrend at this time; continue to trend until peak  · EKG non-ischemic   · Reporting chest tightness as above   · CTA PE study negative for any pulmonary embolism  · Cardiology consulted  · Heparin gtt stopped  · Patient appears hypervolemic    · Start Lasix 40 mg IV twice daily  · TTE completed  · ZIYAD ordered    Bipolar 1 disorder ([place] [address] )  Assessment & Plan  · Hx of Bipolar 1, PTSD, and TRUPTI  · Psych consulted  · Started Zyprexa, Atarax and Prazosin  · Emotional support             VTE Pharmacologic Prophylaxis: VTE Score: 3 Moderate Risk (Score 3-4) - Pharmacological DVT Prophylaxis Ordered: heparin  Patient Centered Rounds: I performed bedside rounds with nursing staff today  Discussions with Specialists or Other Care Team Provider: reviewed notes, discussed with cards    Education and Discussions with Family / Patient: wife at bedside    Total Time Spent on Date of Encounter in care of patient: 25 minutes This time was spent on one or more of the following: performing physical exam; counseling and coordination of care; obtaining or reviewing history; documenting in the medical record; reviewing/ordering tests, medications or procedures; communicating with other healthcare professionals and discussing with patient's family/caregivers  Current Length of Stay: 2 day(s)  Current Patient Status: Inpatient   Certification Statement: The patient will continue to require additional inpatient hospital stay due to Home O2 eval  Discharge Plan: Anticipate discharge tomorrow to home  Code Status: Level 1 - Full Code    Subjective:   Resting in bed wife at bedside  Has productive cough and is intermittently dyspneic with exertion  Swelling in improving    Objective:     Vitals:   Temp (24hrs), Av 2 °F (36 8 °C), Min:97 6 °F (36 4 °C), Max:98 8 °F (37 1 °C)    Temp:  [97 6 °F (36 4 °C)-98 8 °F (37 1 °C)] 97 6 °F (36 4 °C)  HR:  [62-98] 62  Resp:  [18] 18  BP: (130-173)/() 166/101  SpO2:  [86 %-96 %] 88 %  Body mass index is 49 78 kg/m²  Input and Output Summary (last 24 hours): Intake/Output Summary (Last 24 hours) at 2023 0921  Last data filed at 2023 2101  Gross per 24 hour   Intake 0 ml   Output 975 ml   Net -975 ml       Physical Exam:   Physical Exam  Vitals reviewed  Constitutional:       Appearance: He is obese  Cardiovascular:      Rate and Rhythm: Normal rate  Pulses: Normal pulses  Pulmonary:      Effort: Respiratory distress present  Breath sounds: Rhonchi present     Chest:      Chest wall: No tenderness  Skin:     General: Skin is warm  Coloration: Skin is pale  Neurological:      Mental Status: He is alert  Mental status is at baseline  Psychiatric:         Mood and Affect: Mood normal         Additional Data:     Labs:  Results from last 7 days   Lab Units 23  0543 234 05/10/23  0503   WBC Thousand/uL 9 47   < > 10 24*   HEMOGLOBIN g/dL 15 3   < > 14 8   HEMATOCRIT % 46 8   < > 45 0   PLATELETS Thousands/uL 415*   < > 368   NEUTROS PCT %  --   --  79*   LYMPHS PCT %  --   --  11*   MONOS PCT %  --   --  10   EOS PCT %  --   --  0    < > = values in this interval not displayed  Results from last 7 days   Lab Units 23  0543 23  0454 05/10/23  0503   SODIUM mmol/L 137   < > 136   POTASSIUM mmol/L 3 8   < > 3 5   CHLORIDE mmol/L 100   < > 101   CO2 mmol/L 30   < > 25   BUN mg/dL 18   < > 15   CREATININE mg/dL 0 81   < > 0 91   ANION GAP mmol/L 7   < > 10   CALCIUM mg/dL 9 7   < > 9 4   ALBUMIN g/dL  --   --  4 7   TOTAL BILIRUBIN mg/dL  --   --  0 38   ALK PHOS U/L  --   --  64   ALT U/L  --   --  64*   AST U/L  --   --  60*   GLUCOSE RANDOM mg/dL 107   < > 121    < > = values in this interval not displayed       Results from last 7 days   Lab Units 05/10/23  0503   INR  1 03             Results from last 7 days   Lab Units 05/10/23  0737 05/10/23  0503   LACTIC ACID mmol/L 1 8 2 8*       Lines/Drains:  Invasive Devices     Peripheral Intravenous Line  Duration           Peripheral IV 05/10/23 Left Wrist 2 days    Peripheral IV 05/10/23 Right Antecubital 2 days                  Telemetry:  Telemetry Orders (From admission, onward)             48 Hour Telemetry monitoring  Continuous x 48 hours           References:    Telemetry Guidelines   Question:  Reason for 48 Hour Telemetry  Answer:  Acute MI, chest pain - R/O MI, or unstable angina                 Telemetry Reviewed: Normal Sinus Rhythm  Indication for Continued Telemetry Use: No indication for continued use  Will discontinue  Recent Cultures (last 7 days):   Results from last 7 days   Lab Units 05/10/23  0737   BLOOD CULTURE  No Growth at 24 hrs  No Growth at 24 hrs  Last 24 Hours Medication List:   Current Facility-Administered Medications   Medication Dose Route Frequency Provider Last Rate   • acetaminophen  650 mg Oral Q6H PRN Reida Jerry, DO     • aluminum-magnesium hydroxide-simethicone  30 mL Oral Q4H PRN Reida Jerry, DO     • aspirin  81 mg Oral Daily Joss Swan MD     • carBAMazepine  400 mg Oral BID Reida Jerry, DO     • diazepam  10 mg Oral HS Reida Jerry, DO     • enoxaparin  40 mg Subcutaneous Q24H [facility] HOME Joss Swan MD     • furosemide  40 mg Intravenous BID (diuretic) Shaquille Jarrett PA-C     • hydrOXYzine HCL  10 mg Oral Q12H PRN Marcial Lorenz, WHITNP     • ipratropium  0 5 mg Nebulization TID Reida Jerry, DO     • levalbuterol  1 25 mg Nebulization Q8H PRN Reida Jerry, DO     • levalbuterol  1 25 mg Nebulization TID Reida Jerry, DO     • levETIRAcetam  500 mg Oral Q12H [facility] HOME Reida Jerry, DO     • magnesium Oxide  400 mg Oral BID JOSE Machuca     • methylPREDNISolone sodium succinate  40 mg Intravenous [facility] Reida Jerry, DO     • mirtazapine  15 mg Oral HS Reida Jerry, DO     • nicotine  1 patch Transdermal Daily Reida Jerry, DO     • OLANZapine  5 mg Oral Daily JOSE Machuca     • prazosin  2 mg Oral Daily JOSE Machuca          Today, Patient Was Seen By: JOSE Machuca    **Please Note: This note may have been constructed using a voice recognition system  **

## 2023-05-12 NOTE — ASSESSMENT & PLAN NOTE
Wt Readings from Last 3 Encounters:   05/12/23 (!) 140 kg (308 lb 6 8 oz)   05/07/23 (!) 136 kg (300 lb 4 3 oz)     · Acute exacerbation of heart failure with preserved EF  · Patient is experiencing shortness of breath with orthopnea  · Cardiology following  · IV Lasix BID  · Echocardiogram revealed moderate MR  · ZIYAD

## 2023-05-12 NOTE — CASE MANAGEMENT
Case Management Discharge Planning Note    Patient name Ngozi Zavala  Location /-49 MRN [id number]  : 1975 Date 2023       Current Admission Date: 5/10/2023  Current Admission Diagnosis:Acute respiratory failure [facility])   Patient Active Problem List    Diagnosis Date Noted   • Morbid obesity with BMI of 45 0-49 9, adult ([place] [address] ) 2023   • Acute exacerbation of congestive heart failure ([place] [address] ) 2023   • Seizure ([place] [address] ) 05/10/2023   • Elevated troponin 05/10/2023   • Chest pain 05/10/2023   • Tobacco abuse 05/10/2023   • Asthma    • Acute respiratory failure ([place] [address] )    • HTN (hypertension)    • Bipolar 1 disorder ([place] [address] )       LOS (days): 2  Geometric Mean LOS (GMLOS) (days): 3 50  Days to GMLOS:1 1     OBJECTIVE:  Risk of Unplanned Readmission Score: 13 15         Current admission status: Inpatient   Preferred Pharmacy:   [facility]amara Alex [address] [place] [address], [address] [place] [address]  Phone: [phone] Fax: [phone]    [facility]  [phone] - [place]  [place] - [address] [facility] [address]  [place]  [place] [address]  Phone: [phone] Fax: [phone]    Primary Care Provider: No primary care provider on file  Primary Insurance: [employer]ERR[employer] PENDING  Secondary Insurance:     DISCHARGE DETAILS:    Discharge planning discussed with[de-identified] Patient and Patient's Wife  Freedom of Choice: Yes  Comments - Freedom of Choice: CM met with patient and his wife at bedside to review DC needs  Patient's wife reported that she is unhappy with the amount of time they had to wait for case management and how they were treated earlier today  NORIS apologized on behalf of the [employer] team  Patient's wife then reviewed [facility] with NORIS and informed CM that she was able to schedule an appointment with a provider   NORIS then provided patient with the phone numbers and addresses for both [address] in [place] and encouraged patient or his wife to call ASAP to schedule an appointment  Patient's wife then confirmed that [employer] came by earlier today and they are working on obtaining the paperwork for the MA application  Patient and his wife denied any additional questions or concerns at this time  CM contacted family/caregiver?: Yes  Were Treatment Team discharge recommendations reviewed with patient/caregiver?: Yes  Did patient/caregiver verbalize understanding of patient care needs?: Yes  Were patient/caregiver advised of the risks associated with not following Treatment Team discharge recommendations?: Yes    Contacts  Patient Contacts: Yesenia Cespedes  Relationship to Patient[de-identified] Family  Contact Method: In Person  Reason/Outcome: Continuity of Care, Discharge [address]         Is the patient interested in [address] at discharge?: No    DME Referral Provided  Referral made for DME?: No    Other Referral/Resources/Interventions Provided:  Interventions: PCP  Referral Comments: CM provided the numbers and addresses for both [facility] clinics in [place] and encouraged patient vs  his wife to call to schedule an appointment      Would you like to participate in our [facility] service program?  : No - Declined    Treatment Team Recommendation: Home  Discharge Destination Plan[de-identified] Home  Transport at Discharge : Family

## 2023-05-12 NOTE — CASE MANAGEMENT
Case Management Assessment & Discharge Planning Note    Patient name Kevin Willams  Location /-10 MRN [id number]  : 1975 Date 2023       Current Admission Date: 5/10/2023  Current Admission Diagnosis:Acute respiratory failure [facility])   Patient Active Problem List    Diagnosis Date Noted   • Morbid obesity with BMI of 45 0-49 9, adult ([facility] ) 2023   • Acute exacerbation of congestive heart failure ([facility] ) 2023   • Seizure ([place] [address] ) 05/10/2023   • Elevated troponin 05/10/2023   • Chest pain 05/10/2023   • Tobacco abuse 05/10/2023   • Asthma    • Acute respiratory failure ([place] [address] )    • HTN (hypertension)    • Bipolar 1 disorder ([place] [address] )       LOS (days): 2  Geometric Mean LOS (GMLOS) (days): 3 50  Days to GMLOS:1 4     OBJECTIVE:    Risk of Unplanned Readmission Score: 12 66         Current admission status: Inpatient       Preferred Pharmacy:   [address] [place] [address], [address] [place] [address] [address] [facility] 10[address] [facility] [address] [facility]  Phone: [phone] Fax: [phone]    [facility]  [phone] - [place]  [facility] - [address] [facility] [address]  [place]  [address] [place] [address]  Phone: [phone] Fax: [phone]    Primary Care Provider: No primary care provider on file      Primary Insurance: ASH SERRANO PENDING  Secondary Insurance:     ASSESSMENT:  Hamida Roberts 71, Naa Imre U  12  Representative - Spouse   Primary Phone: [phone] (Mobile)               Advance Directives  Does patient have a [address] [facility] [place] [facility] [address]?: No  Was patient offered paperwork?: Yes (provided)  Does patient currently have a Health Care decision maker?: Yes, please see Health Care Proxy section  Does patient have Advance Directives?: No  Was patient offered paperwork?: Yes (provided)  Primary Contact: wife, Violet Soriano [phone]         Readmission Root Cause  30 Day Readmission: No    Patient Information  Admitted from[de-identified] Home  Mental Status: Alert  During Assessment patient was accompanied by: Spouse  Assessment information provided by[de-identified] Patient, Spouse  Primary Caregiver: Self  Support Systems: Self, Spouse/significant other  What city do you live in?: [address] entry access options   Select all that apply : Stairs  Number of steps to enter home : 6  Do the steps have railings?: Yes  Type of Current Residence: Apartment  Upon entering residence, is there a bedroom on the main floor (no further steps)?: Yes  Upon entering residence, is there a bathroom on the main floor (no further steps)?: Yes  In the last 12 months, was there a time when you were not able to pay the mortgage or rent on time?: No  In the last 12 months, how many places have you lived?: 1  In the last 12 months, was there a time when you did not have a steady place to sleep or slept in a shelter (including now)?: Yes  Homeless/housing insecurity resource given?: N/A  Living Arrangements: Lives w/ Spouse/significant other    Activities of Daily Living Prior to Admission  Functional Status: Independent  Completes ADLs independently?: Yes  Ambulates independently?: Yes  Does patient use assisted devices?: No  Does patient have a history of Outpatient Therapy (PT/OT)?: No  Does patient have a history of HHC?: No  Does patient currently have [facility] [address]?: No         Patient Information Continued  Income Source: Employed  Does patient have prescription coverage?: No (pt needs to change insurance plan from [place] to [place])  Within the past 12 months, you worried that your food would run out before you got the money to buy more : Never true  Within the past 12 months, the food you bought just didn't last and you didn't have money to get more : Never true  Food insecurity resource given?: N/A  Does patient receive dialysis treatments?: No  Does patient have a history of substance abuse?: Yes  Historical substance use preference: Alcohol/ETOH  Is patient currently in treatment for substance abuse?: N/A - sober (sober x 11 yrs, ETOH and drugs)  Does patient have a history of Mental Health Diagnosis?: Yes  Is patient receiving treatment for mental health?: No  Treatment options were provided  (pt moved to [facility] and does not have psychiatrist  Provided pt with list of outpt [facility] providers Amadeo Hoffman, [address] [place] [address])         [employer]  [employer] of Transport to [facility][de-identified] Family transport  In the past 12 months, has lack of transportation kept you from medical appointments or from getting medications?: No  In the past 12 months, has lack of transportation kept you from meetings, work, or from getting things needed for daily living?: No  Was application for public transport provided?: N/A        DISCHARGE DETAILS:    Discharge planning discussed with[de-identified] patient and wife at bedside        CM contacted family/caregiver?: Yes             Contacts  Patient Contacts: wifeLico [phone]  Relationship to Patient[de-identified] Family  Contact Method: Phone  Reason/Outcome: Emergency Contact, Referral, Discharge Planning, Continuity of Care               Additional Comments: [facility] consult rec outpt f/u w/ psychiatrist and physiotherapy  CM met with pt and spouse to discuss CM role, obtain baseline information and discuss DCP  Pt and wife rcently moved to [place] from [place]  Pt does not have PCP, or psychiatrist or any doctor to write prescriptions  List of local  PCP and [facility] information provided  List of outpt mental health providers listed  [place]  Wife reports that they are unable to afford pt's medications  Email sent to Financial services regarding assistance  paying for medications and to see if they can assist pt with ASH SERRANO  (one of her psych meds is $1300 w/ good RX)  She was asking about ting care  CM instructed pt to call billing at [employer] and provided main number  Wife was questioning if pt needed oxygen   Per RT 90% RA

## 2023-05-12 NOTE — PLAN OF CARE
Problem: CARDIOVASCULAR - ADULT  Goal: Maintains optimal cardiac output and hemodynamic stability  Description: INTERVENTIONS:  - Monitor I/O, vital signs and rhythm  - Monitor for S/S and trends of decreased cardiac output  - Administer and titrate ordered vasoactive medications to optimize hemodynamic stability  - Assess quality of pulses, skin color and temperature  - Assess for signs of decreased coronary artery perfusion  - Instruct patient to report change in severity of symptoms  Outcome: Progressing  Goal: Absence of cardiac dysrhythmias or at baseline rhythm  Description: INTERVENTIONS:  - Continuous cardiac monitoring, vital signs, obtain 12 lead EKG if ordered  - Administer antiarrhythmic and heart rate control medications as ordered  - Monitor electrolytes and administer replacement therapy as ordered  Outcome: Progressing     Problem: PAIN - ADULT  Goal: Verbalizes/displays adequate comfort level or baseline comfort level  Description: Interventions:  - Encourage patient to monitor pain and request assistance  - Assess pain using appropriate pain scale  - Administer analgesics based on type and severity of pain and evaluate response  - Implement non-pharmacological measures as appropriate and evaluate response  - Consider cultural and social influences on pain and pain management  - Notify physician/advanced practitioner if interventions unsuccessful or patient reports new pain  Outcome: Progressing     Problem: INFECTION - ADULT  Goal: Absence or prevention of progression during hospitalization  Description: INTERVENTIONS:  - Assess and monitor for signs and symptoms of infection  - Monitor lab/diagnostic results  - Monitor all insertion sites, i e  indwelling lines, tubes, and drains  - Monitor endotracheal if appropriate and nasal secretions for changes in amount and color  - [place] appropriate cooling/warming therapies per order  - Administer medications as ordered  - Instruct and encourage patient and family to use good hand hygiene technique  - Identify and instruct in appropriate isolation precautions for identified infection/condition  Outcome: Progressing  Goal: Absence of fever/infection during neutropenic period  Description: INTERVENTIONS:  - Monitor WBC    Outcome: Progressing     Problem: SAFETY ADULT  Goal: Patient will remain free of falls  Description: INTERVENTIONS:  - Educate patient/family on patient safety including physical limitations  - Instruct patient to call for assistance with activity   - Consult OT/PT to assist with strengthening/mobility   - Keep Call bell within reach  - Keep bed low and locked with side rails adjusted as appropriate  - Keep care items and personal belongings within reach  - Initiate and maintain comfort rounds  - Make Fall Risk Sign visible to staff  - Offer Toileting every 2 Hours, in advance of need  - Initiate/Maintain bed alarm  - Obtain necessary fall risk management equipment  - Apply yellow socks and bracelet for high fall risk patients  - Consider moving patient to room near nurses station  Outcome: Progressing  Goal: Maintain or return to baseline ADL function  Description: INTERVENTIONS:  -  Assess patient's ability to carry out ADLs; assess patient's baseline for ADL function and identify physical deficits which impact ability to perform ADLs (bathing, care of mouth/teeth, toileting, grooming, dressing, etc )  - Assess/evaluate cause of self-care deficits   - Assess range of motion  - Assess patient's mobility; develop plan if impaired  - Assess patient's need for assistive devices and provide as appropriate  - Encourage maximum independence but intervene and supervise when necessary  - Involve family in performance of ADLs  - Assess for home care needs following discharge   - Consider OT consult to assist with ADL evaluation and planning for discharge  - Provide patient education as appropriate  Outcome: Progressing  Goal: Maintains/Returns to pre admission functional level  Description: INTERVENTIONS:  - Perform BMAT or MOVE assessment daily    - Set and communicate daily mobility goal to care team and patient/family/caregiver  - Collaborate with rehabilitation services on mobility goals if consulted  - Perform Range of Motion 3 times a day  - Reposition patient every 2hours  - Dangle patient 3 times a day  - Stand patient 3 times a day  - Ambulate patient 3 times a day  - Out of bed to chair 3 times a day   - Out of bed for meals 3 times a day  - Out of bed for toileting  - Record patient progress and toleration of activity level   Outcome: Progressing     Problem: DISCHARGE PLANNING  Goal: Discharge to home or other facility with appropriate resources  Description: INTERVENTIONS:  - Identify barriers to discharge w/patient and caregiver  - Arrange for needed discharge resources and transportation as appropriate  - Identify discharge learning needs (meds, wound care, etc )  - Arrange for interpretive services to assist at discharge as needed  - Refer to Case Management Department for coordinating discharge planning if the patient needs post-hospital services based on physician/advanced practitioner order or complex needs related to functional status, cognitive ability, or social support system  Outcome: Progressing     Problem: Knowledge Deficit  Goal: Patient/family/caregiver demonstrates understanding of disease process, treatment plan, medications, and discharge instructions  Description: Complete learning assessment and assess knowledge base    Interventions:  - Provide teaching at level of understanding  - Provide teaching via preferred learning methods  Outcome: Progressing

## 2023-05-12 NOTE — ASSESSMENT & PLAN NOTE
· Troponin continuing to uptrend at this time; continue to trend until peak  · EKG non-ischemic   · Reporting chest tightness as above   · CTA PE study negative for any pulmonary embolism  · Cardiology consulted  · Heparin gtt stopped  · Patient appears hypervolemic    · Start Lasix 40 mg IV twice daily  · TTE completed  · ZIYAD ordered

## 2023-05-12 NOTE — PROGRESS NOTES
"Cardiology Progress Note - Melida Farmer 41 y o  male MRN: [id number]    Unit/Bed#: -01 Encounter: [id number]      Assessment/Plan:  1  Nonischemic myocardial injury  • High sensitivity troponin peak 732  • Patient endorses shortness or breath, chest heaviness, concern for anginal equivalent  • ECG on admission showed ST, nonspecific ST abnormality  • Patient underwent cardiac catheterization which revealed no significant epicardial coronary artery disease      2   Moderate MR  • TTE this admission showed at least moderate MR, possible restriction of the posterior leaflet, mild stenosis  • Patient significantly agitated today and is restarting psychiatric medication which she has been off for about 2 weeks  • Will schedule ZIYAD outpatient       3  Shortness of breath  • Patient endorses progressive shortness of breath and orthopnea  • Patient denies any improvement with nebulizer treatments  • Patient appears hypervolemic  • Start Lasix 40 mg IV twice daily  • TTE ordered  • Maintain strict I/O's, daily weights, and low sodium diet      3  Asthma  • Currently receiving nebulizer treatments   Management per primary team      4  Tobacco abuse  • Cessation advised      5  Bipolar 1 disorder/PTSD/TRUPTI  • Patient has been off of her medication for 2 weeks  • Patient evaluated by psychiatry      6  Seizure     Subjective:   Patient seen and examined  No significant events overnight  Patient appears agitated at this time  Patient denies any chest pain or shortness of breath       Objective:     Vitals: Blood pressure 137/88, pulse 90, temperature 97 7 °F (36 5 °C), temperature source Oral, resp   rate 19, height 5' 6\" (1 676 m), weight (!) 140 kg (308 lb 6 8 oz), SpO2 (!) 88 % , Body mass index is 49 78 kg/m² ,   Orthostatic Blood Pressures    Flowsheet Row Most Recent Value   Blood Pressure 137/88 filed at 05/12/2023 1513   Patient Position - Orthostatic VS Sitting filed at 05/11/2023 1923    " Intake/Output Summary (Last 24 hours) at 5/12/2023 1544  Last data filed at 5/11/2023 2101  Gross per 24 hour   Intake --   Output 300 ml   Net -300 ml         Physical Exam:  Physical Exam  Vitals and nursing note reviewed  Constitutional:       General: He is not in acute distress  Appearance: He is well-developed  He is obese  HENT:      Head: Normocephalic and atraumatic  Eyes:      Conjunctiva/sclera: Conjunctivae normal    Neck:      Vascular: No carotid bruit  Cardiovascular:      Rate and Rhythm: Normal rate and regular rhythm  Heart sounds: Normal heart sounds  No murmur heard  Pulmonary:      Effort: Pulmonary effort is normal  No respiratory distress  Breath sounds: Normal breath sounds  Abdominal:      Palpations: Abdomen is soft  Tenderness: There is no abdominal tenderness  Musculoskeletal:         General: No swelling  Cervical back: Neck supple  Right lower leg: No edema  Left lower leg: No edema  Skin:     General: Skin is warm and dry  Capillary Refill: Capillary refill takes less than 2 seconds  Neurological:      Mental Status: He is alert and oriented to person, place, and time  Psychiatric:         Mood and Affect: Mood normal          Behavior: Behavior is agitated and withdrawn                Medications:      Current Facility-Administered Medications:   •  acetaminophen (TYLENOL) tablet 650 mg, 650 mg, Oral, Q6H PRN, Juliet Rob, DO, 650 mg at 05/10/23 1404  •  aluminum-magnesium hydroxide-simethicone (MYLANTA) oral suspension 30 mL, 30 mL, Oral, Q4H PRN, Juliet Gillespie, DO, 30 mL at 05/10/23 1533  •  aspirin chewable tablet 81 mg, 81 mg, Oral, Daily, Prabhu Kellogg MD, 81 mg at 05/12/23 0844  •  carBAMazepine (TEGretol XR) 12 hr tablet 400 mg, 400 mg, Oral, BID, Juliet Gillespie, DO, 400 mg at 05/12/23 0844  •  diazepam (VALIUM) tablet 10 mg, 10 mg, Oral, HS, Juliet Gillespie, DO, 10 mg at 05/11/23 2132  • enoxaparin (LOVENOX) subcutaneous injection 40 mg, 40 mg, Subcutaneous, Q24H [facility], Janis Ayon MD, 40 mg at 05/12/23 0844  •  furosemide (LASIX) injection 40 mg, 40 mg, Intravenous, BID (diuretic), Jerrica Cooley PA-C, 40 mg at 05/12/23 0844  •  hydrOXYzine HCL (ATARAX) tablet 10 mg, 10 mg, Oral, Q12H PRN, Starsandy Salas CRNP, 10 mg at 05/12/23 3594  •  ipratropium (ATROVENT) 0 02 % inhalation solution 0 5 mg, 0 5 mg, Nebulization, TID, [facility], 0 5 mg at 05/12/23 1420  •  levalbuterol (XOPENEX) inhalation solution 1 25 mg, 1 25 mg, Nebulization, Q8H PRN, [facility] DO  •  levalbuterol (XOPENEX) inhalation solution 1 25 mg, 1 25 mg, Nebulization, TID, [facility], 1 25 mg at 05/12/23 1420  •  levETIRAcetam (KEPPRA) tablet 500 mg, 500 mg, Oral, Q12H [facility], [facility], 500 mg at 05/12/23 0844  •  magnesium Oxide (MAG-OX) tablet 400 mg, 400 mg, Oral, BID, WHIT MartinoNP, 400 mg at 05/12/23 0844  •  methylPREDNISolone sodium succinate (Solu-MEDROL) injection 40 mg, 40 mg, Intravenous, Q8H [facility], [facility], 40 mg at 05/12/23 1400  •  mirtazapine (REMERON) tablet 15 mg, 15 mg, Oral, HS, [facility], 15 mg at 05/11/23 2132  •  nicotine (NICODERM CQ) 14 mg/24hr TD 24 hr patch 1 patch, 1 patch, Transdermal, Daily, [facility]  •  OLANZapine (ZyPREXA ZYDIS) dispersible tablet 5 mg, 5 mg, Oral, Daily, JOSE Russell, 5 mg at 05/12/23 3411  •  prazosin (MINIPRESS) capsule 2 mg, 2 mg, Oral, Daily, Starlet All, JOSE, 2 mg at 05/12/23 0886     Labs & Results:     Results from last 7 days   Lab Units 05/10/23  1932 05/10/23  1711 05/10/23  1418 05/10/23  0912 05/10/23  0704 05/10/23  0503 05/07/23  1506   HS TNI 0HR ng/L  --   --  714*  --   --  415* 5   HS TNI 2HR ng/L  --  772*  --   --  570*  --   --    HSTNI D2 ng/L  --  58*  --   --  155*  --   --    HS TNI 4HR ng/L 603*  --   --  607*  --   --   --    HSTNI D4 ng/L -111  --   -- "192*  --   --   --      Results from last 7 days   Lab Units 23  0543 23  0454 05/10/23  0503   WBC Thousand/uL 9 47 9 54 10 24*   HEMOGLOBIN g/dL 15 3 14 3 14 8   HEMATOCRIT % 46 8 44 0 45 0   PLATELETS Thousands/uL 415* 380 368     Results from last 7 days   Lab Units 23  0543   TRIGLYCERIDES mg/dL 149   HDL mg/dL 39*     Results from last 7 days   Lab Units 23  0543 23  0454 05/10/23  0503   POTASSIUM mmol/L 3 8 3 9 3 5   CHLORIDE mmol/L 100 103 101   CO2 mmol/L 30 27 25   BUN mg/dL 18 12 15   CREATININE mg/dL 0 81 0 72 0 91   CALCIUM mg/dL 9 7 9 1 9 4   ALK PHOS U/L  --   --  64   ALT U/L  --   --  64*   AST U/L  --   --  60*     Results from last 7 days   Lab Units 23  1451 23  0648 05/10/23  2211 05/10/23  1526 05/10/23  0503   INR   --   --   --   --  1 03   PTT seconds 26 43* 39*   < > 25    < > = values in this interval not displayed  Vitals: Blood pressure 137/88, pulse 90, temperature 97 7 °F (36 5 °C), temperature source Oral, resp   rate 19, height 5' 6\" (1 676 m), weight (!) 140 kg (308 lb 6 8 oz), SpO2 (!) 88 % , Body mass index is 49 78 kg/m² ,   Orthostatic Blood Pressures    Flowsheet Row Most Recent Value   Blood Pressure 137/88 filed at 2023 1513   Patient Position - Orthostatic VS Sitting filed at 2023 9062          Systolic (29SIV), IF , Min:137 , NELLIE:451     Diastolic (91KLZ), XTS:28, Min:86, Max:105        Intake/Output Summary (Last 24 hours) at 2023 1544  Last data filed at 2023 2101  Gross per 24 hour   Intake --   Output 300 ml   Net -300 ml       Invasive Devices     Peripheral Intravenous Line  Duration           Peripheral IV 05/10/23 Left Wrist 2 days    Peripheral IV 05/10/23 Right Antecubital 2 days                      BP Readings from Last 3 Encounters:   23 137/88   23 137/72      Wt Readings from Last 3 Encounters:   23 (!) 140 kg (308 lb 6 8 oz)   23 (!) 136 kg (300 lb 4 3 oz) "

## 2023-05-12 NOTE — RESPIRATORY THERAPY NOTE
"RT Protocol Note  Concepcion Dubin 41 y o  male MRN: [id number]  Unit/Bed#: -01 Encounter: [id number]    Assessment    Principal Problem:    Acute respiratory failure ([facility] )  Active Problems:    Asthma    Bipolar 1 disorder ([place] [address] )    Elevated troponin    Chest pain    Tobacco abuse    Morbid obesity with BMI of 45 0-49 9, adult (HCC)    Acute exacerbation of congestive heart failure ([place] [address] )      Home Pulmonary Medications:         Past Medical History:   Diagnosis Date   • Asthma    • Seizure ([place] [address] )      Social History     Socioeconomic History   • Marital status: /Civil Union     Spouse name: None   • Number of children: None   • Years of education: None   • Highest education level: None   Occupational History   • None   Tobacco Use   • Smoking status: Every Day     Types: Cigarettes   • Smokeless tobacco: Never   Vaping Use   • Vaping Use: Every day   Substance and Sexual Activity   • Alcohol use: Never   • Drug use: Never   • Sexual activity: None   Other Topics Concern   • None   Social History Narrative   • None     Social Determinants of Health     Financial Resource Strain: Not on file   Food Insecurity: No Food Insecurity   • Worried About Running Out of Food in the Last Year: Never true   • Ran Out of Food in the Last Year: Never true   Transportation Needs: No Transportation Needs   • Lack of Transportation (Medical): No   • Lack of Transportation (Non-Medical): No   Physical Activity: Not on file   Stress: Not on file   Social Connections: Not on file   Intimate Partner Violence: Not on file   Housing Stability: High Risk   • Unable to Pay for Housing in the Last Year: No   • Number of Places Lived in the Last Year: 1   • Unstable Housing in the Last Year: Yes       Subjective         Objective    Physical Exam:        Vitals:  Blood pressure (!) 166/101, pulse 62, temperature 97 6 °F (36 4 °C), resp  rate 18, height 5' 6\" (1 676 m), weight (!) 140 kg (308 lb 6 8 oz), SpO2 90 %        " Imaging and other studies: I have personally reviewed pertinent reports        O2 Device: RA     Plan    Respiratory Plan: Mild Distress pathway        Resp Comments: will continue with current tx plan

## 2023-05-12 NOTE — ASSESSMENT & PLAN NOTE
· Hx of Bipolar 1, PTSD, and TRUPTI  · Psych consulted  · Started Zyprexa, Atarax and Prazosin  · Emotional support

## 2023-05-12 NOTE — ASSESSMENT & PLAN NOTE
· Was 87% O2 RA while in ED  · Currently requiring 2L NC   · Suspected to be in setting of acute asthma exacerbation and heart failure exacerbation  · See tx plants below for asthma and CHF exacerbation   · Wean supplemental O2 as able   · Sats drop at night, will complete an overnight pulse ox eval

## 2023-05-13 VITALS
TEMPERATURE: 97.7 F | DIASTOLIC BLOOD PRESSURE: 81 MMHG | OXYGEN SATURATION: 92 % | SYSTOLIC BLOOD PRESSURE: 143 MMHG | BODY MASS INDEX: 47.09 KG/M2 | WEIGHT: 293 LBS | HEIGHT: 66 IN | RESPIRATION RATE: 19 BRPM | HEART RATE: 63 BPM

## 2023-05-13 PROBLEM — I50.9 ACUTE ON CHRONIC CONGESTIVE HEART FAILURE, UNSPECIFIED HEART FAILURE TYPE (HCC): Status: ACTIVE | Noted: 2023-05-13

## 2023-05-13 LAB
ANION GAP SERPL CALCULATED.3IONS-SCNC: 6 MMOL/L (ref 4–13)
BUN SERPL-MCNC: 20 MG/DL (ref 5–25)
CALCIUM SERPL-MCNC: 9.2 MG/DL (ref 8.4–10.2)
CHLORIDE SERPL-SCNC: 100 MMOL/L (ref 96–108)
CO2 SERPL-SCNC: 31 MMOL/L (ref 21–32)
CREAT SERPL-MCNC: 0.86 MG/DL (ref 0.6–1.3)
ERYTHROCYTE [DISTWIDTH] IN BLOOD BY AUTOMATED COUNT: 15.4 % (ref 11.6–15.1)
GFR SERPL CREATININE-BSD FRML MDRD: 104 ML/MIN/1.73SQ M
GLUCOSE SERPL-MCNC: 108 MG/DL (ref 65–140)
HCT VFR BLD AUTO: 48.6 % (ref 36.5–49.3)
HGB BLD-MCNC: 15.5 G/DL (ref 12–17)
MCH RBC QN AUTO: 28.5 PG (ref 26.8–34.3)
MCHC RBC AUTO-ENTMCNC: 31.9 G/DL (ref 31.4–37.4)
MCV RBC AUTO: 89 FL (ref 82–98)
PLATELET # BLD AUTO: 409 THOUSANDS/UL (ref 149–390)
PMV BLD AUTO: 8.5 FL (ref 8.9–12.7)
POTASSIUM SERPL-SCNC: 4.2 MMOL/L (ref 3.5–5.3)
RBC # BLD AUTO: 5.44 MILLION/UL (ref 3.88–5.62)
SODIUM SERPL-SCNC: 137 MMOL/L (ref 135–147)
WBC # BLD AUTO: 12.68 THOUSAND/UL (ref 4.31–10.16)

## 2023-05-13 RX ORDER — METHYLPREDNISOLONE SODIUM SUCCINATE 40 MG/ML
40 INJECTION, POWDER, LYOPHILIZED, FOR SOLUTION INTRAMUSCULAR; INTRAVENOUS EVERY 12 HOURS SCHEDULED
Status: DISCONTINUED | OUTPATIENT
Start: 2023-05-13 | End: 2023-05-13 | Stop reason: HOSPADM

## 2023-05-13 RX ORDER — HYDROXYZINE HYDROCHLORIDE 10 MG/1
10 TABLET, FILM COATED ORAL EVERY 12 HOURS PRN
Qty: 30 TABLET | Refills: 0 | Status: SHIPPED | OUTPATIENT
Start: 2023-05-13

## 2023-05-13 RX ORDER — OLANZAPINE 5 MG/1
5 TABLET, ORALLY DISINTEGRATING ORAL DAILY
Qty: 30 TABLET | Refills: 0 | Status: SHIPPED | OUTPATIENT
Start: 2023-05-14

## 2023-05-13 RX ORDER — FUROSEMIDE 40 MG/1
40 TABLET ORAL DAILY
Qty: 30 TABLET | Refills: 0 | Status: SHIPPED | OUTPATIENT
Start: 2023-05-14

## 2023-05-13 RX ORDER — PRAZOSIN HYDROCHLORIDE 2 MG/1
2 CAPSULE ORAL DAILY
Qty: 30 CAPSULE | Refills: 0 | Status: SHIPPED | OUTPATIENT
Start: 2023-05-14

## 2023-05-13 RX ORDER — PREDNISONE 10 MG/1
TABLET ORAL
Qty: 30 TABLET | Refills: 0 | Status: SHIPPED | OUTPATIENT
Start: 2023-05-13 | End: 2023-05-25

## 2023-05-13 RX ORDER — DIAZEPAM 10 MG/1
10 TABLET ORAL
Qty: 10 TABLET | Refills: 0 | Status: SHIPPED | OUTPATIENT
Start: 2023-05-13 | End: 2023-05-23

## 2023-05-13 RX ORDER — ASPIRIN 81 MG/1
81 TABLET, CHEWABLE ORAL DAILY
Qty: 30 TABLET | Refills: 0 | Status: SHIPPED | OUTPATIENT
Start: 2023-05-14 | End: 2023-06-13

## 2023-05-13 RX ORDER — NICOTINE 21 MG/24HR
1 PATCH, TRANSDERMAL 24 HOURS TRANSDERMAL DAILY
Qty: 28 PATCH | Refills: 0 | Status: SHIPPED | OUTPATIENT
Start: 2023-05-14 | End: 2023-05-18

## 2023-05-13 RX ADMIN — OLANZAPINE 5 MG: 5 TABLET, ORALLY DISINTEGRATING ORAL at 08:10

## 2023-05-13 RX ADMIN — HYDROXYZINE HYDROCHLORIDE 10 MG: 10 TABLET ORAL at 08:11

## 2023-05-13 RX ADMIN — METHYLPREDNISOLONE SODIUM SUCCINATE 40 MG: 40 INJECTION, POWDER, FOR SOLUTION INTRAMUSCULAR; INTRAVENOUS at 05:53

## 2023-05-13 RX ADMIN — Medication 400 MG: at 08:10

## 2023-05-13 RX ADMIN — FUROSEMIDE 40 MG: 40 TABLET ORAL at 08:10

## 2023-05-13 RX ADMIN — PRAZOSIN HYDROCHLORIDE 2 MG: 2 CAPSULE ORAL at 08:11

## 2023-05-13 RX ADMIN — IPRATROPIUM BROMIDE 0.5 MG: 0.5 SOLUTION RESPIRATORY (INHALATION) at 13:57

## 2023-05-13 RX ADMIN — ENOXAPARIN SODIUM 40 MG: 40 INJECTION SUBCUTANEOUS at 08:10

## 2023-05-13 RX ADMIN — IPRATROPIUM BROMIDE 0.5 MG: 0.5 SOLUTION RESPIRATORY (INHALATION) at 07:12

## 2023-05-13 RX ADMIN — CARBAMAZEPINE 400 MG: 100 TABLET, EXTENDED RELEASE ORAL at 08:10

## 2023-05-13 RX ADMIN — ASPIRIN 81 MG: 81 TABLET, CHEWABLE ORAL at 08:10

## 2023-05-13 RX ADMIN — LEVETIRACETAM 500 MG: 500 TABLET, FILM COATED ORAL at 08:10

## 2023-05-13 RX ADMIN — LEVALBUTEROL HYDROCHLORIDE 1.25 MG: 1.25 SOLUTION RESPIRATORY (INHALATION) at 13:57

## 2023-05-13 RX ADMIN — LEVALBUTEROL HYDROCHLORIDE 1.25 MG: 1.25 SOLUTION RESPIRATORY (INHALATION) at 07:11

## 2023-05-13 NOTE — ASSESSMENT & PLAN NOTE
· Patient complaining of left-sided chest pain and indigestion  · JAYDON score of 2  · Troponin peaked at 772  · EKG non-ischemic   · CTA PE study negative for any pulmonary embolism  · Cardiology consulted  · Patient underwent cardiac catheterization which revealed no significant epicardial coronary artery disease    · Echocardiogram revealed moderate MR  · ZIYAD outpatient

## 2023-05-13 NOTE — ASSESSMENT & PLAN NOTE
· Was 87% O2 RA while in ED  · Currently requiring 2L NC   · Suspected to be in setting of acute asthma exacerbation and heart failure exacerbation  · See tx plants below for asthma and CHF exacerbation   · Wean supplemental O2 as able   · Overnight pulse ox evaluation reveals that patient's oxygen saturation does drop down into the 70s while sleeping  · Patient should qualify for night time oxygen use  · Encouraged patient to follow-up outpatient with pulmonary for sleep apnea evaluation

## 2023-05-13 NOTE — PROGRESS NOTES
[address] [place] [address]  Progress Note  Name: Kevin Willams  MRN: [id number]  Unit/Bed#: -01 I Date of Admission: 5/10/2023   Date of Service: 5/13/2023 I Hospital Day: 3    Assessment/Plan   * Acute respiratory failure ([facility] [address] )  Assessment & Plan  · Was 87% O2 RA while in ED  · Currently requiring 2L NC   · Suspected to be in setting of acute asthma exacerbation and heart failure exacerbation  · See tx plants below for asthma and CHF exacerbation   · Wean supplemental O2 as able   · Overnight pulse ox evaluation reveals that patient's oxygen saturation does drop down into the 70s while sleeping  · Patient should qualify for night time oxygen use  · Encouraged patient to follow-up outpatient with pulmonary for sleep apnea evaluation    Acute exacerbation of congestive heart failure ([facility] )  Assessment & Plan  Wt Readings from Last 3 Encounters:   05/13/23 (!) 138 kg (305 lb 5 4 oz)   05/07/23 (!) 136 kg (300 lb 4 3 oz)     · Acute exacerbation of heart failure with preserved EF  · Patient is experiencing shortness of breath with orthopnea  · Cardiology following  · Addition to oral Lasix  · Echocardiogram revealed moderate MR  · We will get outpatient ZIYAD    Chest pain  Assessment & Plan  · Patient complaining of left-sided chest pain and indigestion  · JAYDON score of 2  · Troponin peaked at 772  · EKG non-ischemic   · CTA PE study negative for any pulmonary embolism  · Cardiology consulted  · Patient underwent cardiac catheterization which revealed no significant epicardial coronary artery disease    · Echocardiogram revealed moderate MR  · ZIYAD outpatient    Asthma  Assessment & Plan  · Patient with wheezing noted  · Seen in ED on 05/07 w/ asthma exacerbation; improved w/nebs, discharged on PO prednisone and Z-dayana  · Requiring 3L NC at this time  · CXR indicative of vascular congestion  · Respiratory status has improved we will wean IV steroids    Morbid obesity with BMI of 45 0-49 9, adult [facility])  Assessment & Plan  Counseled on lifestyle modifications    Tobacco abuse  Assessment & Plan  · Discussed importance of smoking cessation for greater than 3 minutes  · Nicotine patch ordered    Elevated troponin  Assessment & Plan  · Troponin continuing to uptrend at this time; continue to trend until peak  · EKG non-ischemic   · Reporting chest tightness as above   · CTA PE study negative for any pulmonary embolism  · Cardiology consulted  · Patient is now euvolemic  · Change from IV Lasix to p o  Lasix  · TTE completed revealed moderate MR  · ZIYAD outpatient    Bipolar 1 disorder (HCC)  Assessment & Plan  · Hx of Bipolar 1, PTSD, and TRUPTI  · Psych consulted  · Started Zyprexa, Atarax and Prazosin  · Emotional support         VTE Pharmacologic Prophylaxis: VTE Score: 3 Moderate Risk (Score 3-4) - Pharmacological DVT Prophylaxis Ordered: enoxaparin (Lovenox)  Patient Centered Rounds: I performed bedside rounds with nursing staff today  Discussions with Specialists or Other Care Team Provider: Discussed with case management    Education and Discussions with Family / Patient: Updated  (wife) at bedside  Total Time Spent on Date of Encounter in care of patient: 35 minutes This time was spent on one or more of the following: performing physical exam; counseling and coordination of care; obtaining or reviewing history; documenting in the medical record; reviewing/ordering tests, medications or procedures; communicating with other healthcare professionals and discussing with patient's family/caregivers  Current Length of Stay: 3 day(s)  Current Patient Status: Inpatient   Certification Statement: The patient will continue to require additional inpatient hospital stay due to Home oxygen set up  Discharge Plan: Anticipate discharge later today or tomorrow to home with home services      Code Status: Level 1 - Full Code    Subjective:   Resting in bed respiratory status is back to baseline offers no complaints    Objective:     Vitals:   Temp (24hrs), Av 7 °F (36 5 °C), Min:97 7 °F (36 5 °C), Max:97 7 °F (36 5 °C)    Temp:  [97 7 °F (36 5 °C)] 97 7 °F (36 5 °C)  HR:  [55-82] 63  BP: (119-143)/(78-90) 143/81  SpO2:  [84 %-93 %] 92 %  Body mass index is 49 28 kg/m²  Input and Output Summary (last 24 hours):   No intake or output data in the 24 hours ending 23 1535    Physical Exam:   Physical Exam  Vitals reviewed  Cardiovascular:      Rate and Rhythm: Normal rate  Pulmonary:      Effort: No respiratory distress  Breath sounds: Wheezing present  Abdominal:      Palpations: Abdomen is soft  Neurological:      Mental Status: He is alert  Mental status is at baseline  Psychiatric:         Mood and Affect: Mood normal         Additional Data:     Labs:  Results from last 7 days   Lab Units 23  0534 05/11/23  0454 05/10/23  0503   WBC Thousand/uL 12 68*   < > 10 24*   HEMOGLOBIN g/dL 15 5   < > 14 8   HEMATOCRIT % 48 6   < > 45 0   PLATELETS Thousands/uL 409*   < > 368   NEUTROS PCT %  --   --  79*   LYMPHS PCT %  --   --  11*   MONOS PCT %  --   --  10   EOS PCT %  --   --  0    < > = values in this interval not displayed  Results from last 7 days   Lab Units 2334 05/11/23  0454 05/10/23  0503   SODIUM mmol/L 137   < > 136   POTASSIUM mmol/L 4 2   < > 3 5   CHLORIDE mmol/L 100   < > 101   CO2 mmol/L 31   < > 25   BUN mg/dL 20   < > 15   CREATININE mg/dL 0 86   < > 0 91   ANION GAP mmol/L 6   < > 10   CALCIUM mg/dL 9 2   < > 9 4   ALBUMIN g/dL  --   --  4 7   TOTAL BILIRUBIN mg/dL  --   --  0 38   ALK PHOS U/L  --   --  64   ALT U/L  --   --  64*   AST U/L  --   --  60*   GLUCOSE RANDOM mg/dL 108   < > 121    < > = values in this interval not displayed       Results from last 7 days   Lab Units 05/10/23  0503   INR  1 03             Results from last 7 days   Lab Units 05/10/23  0737 05/10/23  0503   LACTIC ACID mmol/L 1 8 2 8*       Lines/Drains:  Invasive Devices Peripheral Intravenous Line  Duration           Peripheral IV 05/10/23 Left Wrist 3 days    Peripheral IV 05/10/23 Right Antecubital 3 days              Imaging: No pertinent imaging reviewed  Recent Cultures (last 7 days):   Results from last 7 days   Lab Units 05/10/23  0737   BLOOD CULTURE  No Growth at 72 hrs  No Growth at 72 hrs  Last 24 Hours Medication List:   Current Facility-Administered Medications   Medication Dose Route Frequency Provider Last Rate   • acetaminophen  650 mg Oral Q6H PRN Samemarielena Alvares, DO     • aluminum-magnesium hydroxide-simethicone  30 mL Oral Q4H PRN Samemarielena Alvares, DO     • aspirin  81 mg Oral Daily Angela Daily MD     • carBAMazepine  400 mg Oral BID [facility] Giorgio, DO     • diazepam  10 mg Oral HS Sameul Duck, DO     • enoxaparin  40 mg Subcutaneous Q24H [facility] Angela Daily MD     • furosemide  40 mg Oral Daily JOSE Menjivar     • hydrOXYzine HCL  10 mg Oral Q12H PRN JOSE Cruz     • ipratropium  0 5 mg Nebulization TID Sameul Duck, DO     • levalbuterol  1 25 mg Nebulization Q8H PRN [facility] Giorgio, DO     • levalbuterol  1 25 mg Nebulization TID [facility] Giorgio, DO     • levETIRAcetam  500 mg Oral Q12H [facility] Giorgio DO     • magnesium Oxide  400 mg Oral BID JOSE Cruz     • methylPREDNISolone sodium succinate  40 mg Intravenous Q12H [facility] JOSE Cruz     • mirtazapine  15 mg Oral HS Sameul Duck, DO     • nicotine  1 patch Transdermal Daily [facility] Giorgio, DO     • OLANZapine  5 mg Oral Daily JOSE Cruz     • prazosin  2 mg Oral Daily JOSE Cruz          Today, Patient Was Seen By: JOSE Cruz    **Please Note: This note may have been constructed using a voice recognition system  **

## 2023-05-13 NOTE — ASSESSMENT & PLAN NOTE
· Troponin continuing to uptrend at this time; continue to trend until peak  · EKG non-ischemic   · Reporting chest tightness as above   · CTA PE study negative for any pulmonary embolism  · Cardiology consulted  · Patient is now euvolemic  · Change from IV Lasix to p o   Lasix  · TTE completed revealed moderate MR  · ZIYAD outpatient

## 2023-05-13 NOTE — PROGRESS NOTES
[address] [place] [address]  Progress Note  Name: Prashanth Bravo  MRN: [id number]  Unit/Bed#: -01 I Date of Admission: 5/10/2023   Date of Service: 5/13/2023 I Hospital Day: 3    Assessment/Plan   * Acute respiratory failure ([facility] [address] )  Assessment & Plan  · Was 87% O2 RA while in ED  · Currently requiring 2L NC   · Suspected to be in setting of acute asthma exacerbation and heart failure exacerbation  · See tx plants below for asthma and CHF exacerbation   · Wean supplemental O2 as able   · Overnight pulse ox evaluation reveals that patient's oxygen saturation does drop down into the 70s while sleeping  · Patient should qualify for night time oxygen use  · Encouraged patient to follow-up outpatient with pulmonary for sleep apnea evaluation    Acute exacerbation of congestive heart failure ([facility] )  Assessment & Plan  Wt Readings from Last 3 Encounters:   05/13/23 (!) 138 kg (305 lb 5 4 oz)   05/07/23 (!) 136 kg (300 lb 4 3 oz)     · Acute exacerbation of heart failure with preserved EF  · Patient is experiencing shortness of breath with orthopnea  · Cardiology following  · Addition to oral Lasix  · Echocardiogram revealed moderate MR  · We will get outpatient ZIYAD    Chest pain  Assessment & Plan  · Patient complaining of left-sided chest pain and indigestion  · JAYDON score of 2  · Troponin peaked at 772  · EKG non-ischemic   · CTA PE study negative for any pulmonary embolism  · Cardiology consulted  · Patient underwent cardiac catheterization which revealed no significant epicardial coronary artery disease    · Echocardiogram revealed moderate MR  · ZIYAD outpatient    Asthma  Assessment & Plan  · Patient with wheezing noted  · Seen in ED on 05/07 w/ asthma exacerbation; improved w/nebs, discharged on PO prednisone and Z-dayana  · Requiring 3L NC at this time  · CXR indicative of vascular congestion  · Respiratory status has improved we will wean IV steroids    Acute on chronic congestive heart failure, unspecified heart failure type [facility])  Assessment & Plan  Wt Readings from Last 3 Encounters:   05/13/23 (!) 138 kg (305 lb 5 4 oz)   05/07/23 (!) 136 kg (300 lb 4 3 oz)             Morbid obesity with BMI of 45 0-49 9, adult ([facility] 75 )  Assessment & Plan  Counseled on lifestyle modifications    Tobacco abuse  Assessment & Plan  · Discussed importance of smoking cessation for greater than 3 minutes  · Nicotine patch ordered    Elevated troponin  Assessment & Plan  · Troponin continuing to uptrend at this time; continue to trend until peak  · EKG non-ischemic   · Reporting chest tightness as above   · CTA PE study negative for any pulmonary embolism  · Cardiology consulted  · Patient is now euvolemic  · Change from IV Lasix to p o  Lasix  · TTE completed revealed moderate MR  · ZIYAD outpatient    Bipolar 1 disorder ([facility] 75 )  Assessment & Plan  · Hx of Bipolar 1, PTSD, and TRUPTI  · Psych consulted  · Started Zyprexa, Atarax and Prazosin  · Emotional support          Discharging Physician / Practitioner: Tabitha Machuca  PCP: No primary care provider on file  Admission Date:   Admission Orders (From admission, onward)     Ordered        05/10/23 0739  INPATIENT ADMISSION  Once                      Discharge Date: 05/13/23    Medical Problems     Resolved Problems  Date Reviewed: 5/10/2023   None         Consultations During Hospital Stay:  Edy Iraheta TO CASE MANAGEMENT  IP CONSULT TO CARDIOLOGY  IP CONSULT TO PSYCHIATRY  IP CONSULT TO CASE MANAGEMENT    Procedures Performed:   XR chest 1 view portable    Result Date: 5/10/2023  Narrative: CHEST INDICATION:   sob  COMPARISON: 5/7/2023 EXAM PERFORMED/VIEWS:  XR CHEST PORTABLE Single view FINDINGS: Persistent suspicion of mild vascular congestion Cardiomediastinal silhouette appears unremarkable  The lungs are clear  No pneumothorax or pleural effusion  Osseous structures appear within normal limits for patient age       Impression: Mild vascular congestion Similar to previous Workstation performed: [id number]     XR chest 1 view portable    Result Date: 5/8/2023  Narrative: CHEST INDICATION:   sob, wheezing  COMPARISON:  None EXAM PERFORMED/VIEWS:  XR CHEST PORTABLE Single view FINDINGS: Mild vascular congestion Cardiomediastinal silhouette appears unremarkable  The lungs are otherwise clear  No pneumothorax or pleural effusion  Osseous structures appear within normal limits for patient age  Impression: Mild vascular congestion Workstation performed: [id number]     Cardiac catheterization    Result Date: 5/11/2023  Narrative: •  No significant obstructive epicardial coronary artery disease •  Right radial access  Hemostasis achieved with manual pressure  No significant obstructive epicardial coronary artery disease     CTA ED chest PE Study    Result Date: 5/10/2023  Narrative: CTA - CHEST WITH IV CONTRAST - PULMONARY ANGIOGRAM INDICATION:   sob / hypoxic with +troponin  COMPARISON: Chest x-ray dated 5/10/2023 and 5/7/2020 2320  TECHNIQUE: CTA examination of the chest was performed using angiographic technique according to a protocol specifically tailored to evaluate for pulmonary embolism  Multiplanar 2D reformatted images were created from the source data  In addition, coronal 3D MIP postprocessing was performed on the acquisition scanner  Radiation dose length product (DLP) for this visit:  527 mGy-cm   This examination, like all CT scans performed in the [facility], was performed utilizing techniques to minimize radiation dose exposure, including the use of iterative reconstruction and automated exposure control  IV Contrast:  100 mL of iohexol (OMNIPAQUE) FINDINGS: PULMONARY ARTERIAL TREE:  No pulmonary embolus is seen  LUNGS: Reticulonodular opacities noted in the bilateral lungs, most prominently in the bilateral lower lobes, the inferior aspect of the right upper lobe, and in the right middle lobe   Patchy alveolar opacities are seen in the right lower lobe, suspicious for multifocal infection in the appropriate clinical setting  Some scattered air trapping is seen in the lungs which correlates with the patient's history of asthma/reactive airway disease  PLEURA:  Unremarkable  HEART/GREAT VESSELS:  Unremarkable for patient's age  No thoracic aortic aneurysm  MEDIASTINUM AND NETO: Shotty mediastinal and hilar lymph nodes, nonspecific, possibly reactive  CHEST WALL AND LOWER NECK:   Unremarkable  VISUALIZED STRUCTURES IN THE UPPER ABDOMEN:  Unremarkable  OSSEOUS STRUCTURES:  No acute fracture or destructive osseous lesion  Impression: No pulmonary embolism is seen  Reticulonodular opacities noted in the bilateral lungs, most prominently in the bilateral lower lobes, the inferior aspect of the right upper lobe, and in the right middle lobe  Patchy alveolar opacities are seen in the right lower lobe; suspicious for multifocal infection in the appropriate clinical setting  Correlation with the patient's symptoms and laboratory values recommended  Shotty mediastinal and hilar lymph nodes, nonspecific, possibly reactive  Other findings as above  Workstation performed: [id number]     Echo complete w/ contrast if indicated    Result Date: 5/10/2023  Narrative: •  Left Ventricle: Left ventricular cavity size is normal  Wall thickness is normal  The left ventricular ejection fraction is 60%  Systolic function is normal  Wall motion is normal  Diastolic function is normal  •  Left Atrium: The atrium is mildly dilated  •  Right Atrium: The atrium is mildly dilated  •  Mitral Valve: There is annular calcification  There is at least moderate regurgitation  Possible restriction of the posterior leaflet  There is mild stenosis  Consider further evaluation with ZIYAD  •  IVC/SVC: The inferior vena cava is dilated     ·   ·     Significant Findings / Test Results:   · See above    Incidental Findings:   · See above    Test Results Pending at Discharge (will require follow up):   · See above     Outpatient Tests Requested:  · See above    Complications: None    Past Medical History:   Diagnosis Date   • Asthma    • Seizure [facility])        Reason for Admission: Asthma (Pt arrives via EMS c/o an asthma attack starting tonight  Pt recently diagnosed w/ bronchitis  Pt reports sudden onset of wheezing/ SOB  Used rescue inhaler w/ no relief  Given 2 duo nebs from EMS )       Hospital Course:     Eric Wong is a 41 y o  male patient with past medical history of see above who originally presented to the hospital on 5/10/2023 due to Asthma (Pt arrives via EMS c/o an asthma attack starting tonight  Pt recently diagnosed w/ bronchitis  Pt reports sudden onset of wheezing/ SOB  Used rescue inhaler w/ no relief  Given 2 duo nebs from EMS )  Patient came in with worsening shortness of breath was found to have a heart failure exacerbation combined with asthma exacerbation and likely undiagnosed underlying COPD as patient has been smoking since she was a teenager  Patient underwent a cardiac cath which was normal   She underwent echocardiogram which revealed moderate mitral valve regurgitation she will need outpatient ZIYAD she understands this and will continue outpatient follow-up  She will need to follow-up with a psychiatrist regarding her anxiety depression and bipolar disorder, she will need to follow-up with cardiology and get her ZIYAD and she will need to follow-up with a PCP        Please see above list of diagnoses and related plan for additional information       Condition at Discharge: stable     Discharge Day Visit / Exam:     Subjective: Patient understands all of her instructions verbalizes understanding and offers no complaints understands the importance of smoking cessation  Vitals: Blood Pressure: 143/81 (05/13/23 1453)  Pulse: 63 (05/13/23 0720)  Temperature: 97 7 °F (36 5 °C) (05/12/23 2300)  Temp Source: Oral (05/12/23 1513)  Respirations: 19 (05/12/23 1513)  Height: 5' "6\" (167 6 cm) (05/10/23 1524)  Weight - Scale: (!) 138 kg (305 lb 5 4 oz) (05/13/23 3314)  SpO2: 92 % (05/13/23 1358)  Exam:   Physical Exam  Vitals reviewed  Cardiovascular:      Rate and Rhythm: Normal rate  Abdominal:      Palpations: Abdomen is soft  Skin:     General: Skin is warm  Neurological:      Mental Status: He is alert  Mental status is at baseline  Psychiatric:         Mood and Affect: Mood normal          Discussion with Family: wife at bedside    Discharge instructions/Information to patient and family:   See after visit summary for information provided to patient and family  Provisions for Follow-Up Care:  See after visit summary for information related to follow-up care and any pertinent home health orders  Disposition:     Home    Planned Readmission: no     Discharge Statement:  I spent 45 minutes discharging the patient  This time was spent on the day of discharge  I had direct contact with the patient on the day of discharge  Greater than 50% of the total time was spent examining patient, answering all patient questions, arranging and discussing plan of care with patient as well as directly providing post-discharge instructions  Additional time then spent on discharge activities  Discharge Medications:  See after visit summary for reconciled discharge medications provided to patient and family        ** Please Note: This note has been constructed using a voice recognition system **    "

## 2023-05-13 NOTE — ASSESSMENT & PLAN NOTE
· Patient with wheezing noted  · Seen in ED on 05/07 w/ asthma exacerbation; improved w/nebs, discharged on PO prednisone and Z-dayana  · Requiring 3L NC at this time  · CXR indicative of vascular congestion  · Respiratory status has improved we will wean IV steroids

## 2023-05-13 NOTE — CASE MANAGEMENT
Case Management Discharge Planning Note    Patient name Charley Mesa  Location /-83 MRN [id number]  : 1975 Date 2023       Current Admission Date: 5/10/2023  Current Admission Diagnosis:Acute respiratory failure [facility])   Patient Active Problem List    Diagnosis Date Noted   • Acute on chronic congestive heart failure, unspecified heart failure type ([facility] ) 2023   • Morbid obesity with BMI of 45 0-49 9, adult ([place] [address] ) 2023   • Acute exacerbation of congestive heart failure ([place] [address] ) 2023   • Seizure ([place] [address] ) 05/10/2023   • Elevated troponin 05/10/2023   • Chest pain 05/10/2023   • Tobacco abuse 05/10/2023   • Asthma    • Acute respiratory failure ([place] [address] )    • HTN (hypertension)    • Bipolar 1 disorder ([place] [address] )       LOS (days): 3  Geometric Mean LOS (GMLOS) (days): 3 50  Days to GMLOS:0 1     OBJECTIVE:  Risk of Unplanned Readmission Score: 13 35         Current admission status: Inpatient   Preferred Pharmacy:   [address] [place] [address] [address] [facility]  Phone: [phone] Fax: [phone]    LYLA Cabrera  [phone] - [place]  [place] - [address] [facility] [phone]  [place]  [address] [place] [address]  Phone: [phone] Fax: [phone]    [address] [facility] [address]  [address], [place] - [address]  [address] [facility] [address]  Phone: [phone] Fax: [phone]    Primary Care Provider: No primary care provider on file  Primary Insurance: ASH SERRANO PENDING  Secondary Insurance:     DISCHARGE DETAILS:                                          Other Referral/Resources/Interventions Provided:  Interventions: DME  Referral Comments: Pt qualified for [id number]  Referral sent to Claire asking for credit card info  Pt refuses to provide credit card-states that she has no money and has been using her credit cards for food, so they are maxed out    CM torin Tangro who spoke to director Herminia Snell agrees to have [facility] cover 1 month only of oxygen  She will sign all necessary paperwork on Monday  [employer] is agreeable to this and CM given permission to take a Byron from stock closet, but no Byron was available  Dave gives permission to take an Eclipse with instructions to turn the liter flow to 2  Pt and partner made aware that [facility] will only cover 1 month

## 2023-05-13 NOTE — RESPIRATORY THERAPY NOTE
05/12/23 2200   Respiratory Assessment   General Appearance Awake   Respiratory Pattern Dyspnea with exertion;Dyspnea at rest   Chest Assessment Chest expansion symmetrical   Bilateral Breath Sounds Diminished; Expiratory wheezes   Resp Comments Starting ovenight pulse ox on RA  Alarms are set and trends have been cleared     O2 Device RA   Oxygen Therapy/Pulse Ox   O2 Device None (Room air)   SpO2 Activity At Rest   $ Overnight Pulse Ox Started

## 2023-05-13 NOTE — PLAN OF CARE
Problem: CARDIOVASCULAR - ADULT  Goal: Maintains optimal cardiac output and hemodynamic stability  Description: INTERVENTIONS:  - Monitor I/O, vital signs and rhythm  - Monitor for S/S and trends of decreased cardiac output  - Administer and titrate ordered vasoactive medications to optimize hemodynamic stability  - Assess quality of pulses, skin color and temperature  - Assess for signs of decreased coronary artery perfusion  - Instruct patient to report change in severity of symptoms  Outcome: Progressing  Goal: Absence of cardiac dysrhythmias or at baseline rhythm  Description: INTERVENTIONS:  - Continuous cardiac monitoring, vital signs, obtain 12 lead EKG if ordered  - Administer antiarrhythmic and heart rate control medications as ordered  - Monitor electrolytes and administer replacement therapy as ordered  Outcome: Progressing     Problem: PAIN - ADULT  Goal: Verbalizes/displays adequate comfort level or baseline comfort level  Description: Interventions:  - Encourage patient to monitor pain and request assistance  - Assess pain using appropriate pain scale  - Administer analgesics based on type and severity of pain and evaluate response  - Implement non-pharmacological measures as appropriate and evaluate response  - Consider cultural and social influences on pain and pain management  - Notify physician/advanced practitioner if interventions unsuccessful or patient reports new pain  Outcome: Progressing     Problem: INFECTION - ADULT  Goal: Absence or prevention of progression during hospitalization  Description: INTERVENTIONS:  - Assess and monitor for signs and symptoms of infection  - Monitor lab/diagnostic results  - Monitor all insertion sites, i e  indwelling lines, tubes, and drains  - Monitor endotracheal if appropriate and nasal secretions for changes in amount and color  - [place] appropriate cooling/warming therapies per order  - Administer medications as ordered  - Instruct and encourage patient and family to use good hand hygiene technique  - Identify and instruct in appropriate isolation precautions for identified infection/condition  Outcome: Progressing  Goal: Absence of fever/infection during neutropenic period  Description: INTERVENTIONS:  - Monitor WBC    Outcome: Progressing     Problem: SAFETY ADULT  Goal: Patient will remain free of falls  Description: INTERVENTIONS:  - Educate patient/family on patient safety including physical limitations  - Instruct patient to call for assistance with activity   - Consult OT/PT to assist with strengthening/mobility   - Keep Call bell within reach  - Keep bed low and locked with side rails adjusted as appropriate  - Keep care items and personal belongings within reach  - Initiate and maintain comfort rounds  - Make Fall Risk Sign visible to staff  - Obtain necessary fall risk management equipment  - Apply yellow socks and bracelet for high fall risk patients  - Consider moving patient to room near nurses station  Outcome: Progressing  Goal: Maintain or return to baseline ADL function  Description: INTERVENTIONS:  -  Assess patient's ability to carry out ADLs; assess patient's baseline for ADL function and identify physical deficits which impact ability to perform ADLs (bathing, care of mouth/teeth, toileting, grooming, dressing, etc )  - Assess/evaluate cause of self-care deficits   - Assess range of motion  - Assess patient's mobility; develop plan if impaired  - Assess patient's need for assistive devices and provide as appropriate  - Encourage maximum independence but intervene and supervise when necessary  - Involve family in performance of ADLs  - Assess for home care needs following discharge   - Consider OT consult to assist with ADL evaluation and planning for discharge  - Provide patient education as appropriate  Outcome: Progressing  Goal: Maintains/Returns to pre admission functional level  Description: INTERVENTIONS:  - Perform BMAT or MOVE assessment daily    - Set and communicate daily mobility goal to care team and patient/family/caregiver  - Collaborate with rehabilitation services on mobility goals if consulted  - Perform Range of Motion  times a day  - Reposition patient every  hours  - Dangle patient  times a day  - Stand patient  times a day  - Ambulate patient  times a day  - Out of bed to chair  times a day   - Out of bed for meals  times a day  - Out of bed for toileting  - Record patient progress and toleration of activity level   Outcome: Progressing     Problem: DISCHARGE PLANNING  Goal: Discharge to home or other facility with appropriate resources  Description: INTERVENTIONS:  - Identify barriers to discharge w/patient and caregiver  - Arrange for needed discharge resources and transportation as appropriate  - Identify discharge learning needs (meds, wound care, etc )  - Arrange for interpretive services to assist at discharge as needed  - Refer to Case Management Department for coordinating discharge planning if the patient needs post-hospital services based on physician/advanced practitioner order or complex needs related to functional status, cognitive ability, or social support system  Outcome: Progressing     Problem: Knowledge Deficit  Goal: Patient/family/caregiver demonstrates understanding of disease process, treatment plan, medications, and discharge instructions  Description: Complete learning assessment and assess knowledge base    Interventions:  - Provide teaching at level of understanding  - Provide teaching via preferred learning methods  Outcome: Progressing

## 2023-05-13 NOTE — ASSESSMENT & PLAN NOTE
Wt Readings from Last 3 Encounters:   05/13/23 (!) 138 kg (305 lb 5 4 oz)   05/07/23 (!) 136 kg (300 lb 4 3 oz)

## 2023-05-13 NOTE — CASE MANAGEMENT
Case Management Discharge Planning Note    Patient name Dirk Hickey /-47 MRN [id number]  : 1975 Date 2023       Current Admission Date: 5/10/2023  Current Admission Diagnosis:Acute respiratory failure [facility])   Patient Active Problem List    Diagnosis Date Noted   • Morbid obesity with BMI of 45 0-49 9, adult ([place] [address] ) 2023   • Acute exacerbation of congestive heart failure ([place] [address] ) 2023   • Seizure ([place] [address] ) 05/10/2023   • Elevated troponin 05/10/2023   • Chest pain 05/10/2023   • Tobacco abuse 05/10/2023   • Asthma    • Acute respiratory failure ([place] [address] )    • HTN (hypertension)    • Bipolar 1 disorder ([place] [address] )       LOS (days): 3  Geometric Mean LOS (GMLOS) (days): 3 50  Days to GMLOS:0 4     OBJECTIVE:  Risk of Unplanned Readmission Score: 13 29         Current admission status: Inpatient   Preferred Pharmacy:   Archana Benitez [address] [place] [address] [address] [facility]  Phone: [phone] Fax: [phone]    [facility]  [phone] [facility] [place]  [place] - [address] [facility] [phone]  [place]  [address] [place] [address]  Phone: [phone] Fax: [phone]    Primary Care Provider: No primary care provider on file  Primary Insurance: [employer]ERR[employer] PENDING  Secondary Insurance:     DISCHARGE DETAILS:                                          Other Referral/Resources/Interventions Provided:  Referral Comments: [facility] is requesting copy of license, proof of residence, proof of income and SS award letter  Info faxed to [facility]    No E-mail address on request, just a web site, so info was faxed

## 2023-05-13 NOTE — ASSESSMENT & PLAN NOTE
Wt Readings from Last 3 Encounters:   05/13/23 (!) 138 kg (305 lb 5 4 oz)   05/07/23 (!) 136 kg (300 lb 4 3 oz)     · Acute exacerbation of heart failure with preserved EF  · Patient is experiencing shortness of breath with orthopnea  · Cardiology following  · Addition to oral Lasix  · Echocardiogram revealed moderate MR  · We will get outpatient ZIYAD

## 2023-05-13 NOTE — PROGRESS NOTES
General Cardiology   Progress Note   Peter Lee 41 y o  male MRN: [id number]  Unit/Bed#: -01 Encounter: [id number]      SUBJECTIVE:   No significant events overnight  Patient feels better  Patient still has some shortness of breath with hypoxia at night being evaluated by primary service  Patient does have history of asthma patient also has at least moderate mitral regurgitation and will need transesophageal echo  Patient's bipolar medications were restarted  REVIEW OF SYSTEMS:  Constitutional:  Denies fever or chills   Eyes:  Denies change in visual acuity   HENT:  Denies nasal congestion or sore throat   Respiratory:  shortness of breath   Cardiovascular:  Denies chest pain or edema   GI:  Denies abdominal pain, nausea, vomiting, bloody stools or diarrhea   :  Denies dysuria, frequency, difficulty in micturition and nocturia  Musculoskeletal:  Denies back pain or joint pain   Neurologic:  Denies headache, focal weakness or sensory changes   Endocrine:  Denies polyuria or polydipsia   Lymphatic:  Denies swollen glands   Psychiatric:  Denies depression or anxiety     OBJECTIVE:   Vitals:  Vitals:    05/13/23 0720   BP: 142/89   Pulse: 63   Resp:    Temp:    SpO2: 90%     Body mass index is 49 28 kg/m²  Systolic (09DSM), QXP:245 , Min:119 , AZC:246     Diastolic (49LYK), RPU:91, Min:78, Max:90    No intake or output data in the 24 hours ending 05/13/23 1126  Weight (last 2 days)       Date/Time Weight    05/13/23 0538 138 (305 34)    05/12/23 0600 140 (308 42)    05/11/23 0600 140 (308 64)                PHYSICAL EXAMS:  General:  Patient is not in acute distress, laying in the bed comfortably, awake, alert responding to commands  Head: Normocephalic, Atraumatic  HEENT:  Both pupils normal-size atraumatic, normocephalic, nonicteric  Neck:  JVP not raised   Trachea central  Respiratory: Decreased breath sounds bilateral  Cardiovascular: Regular rate and rhythm with systolic murmur in the mitral area  GI:  Abdomen soft nontender  Liver and spleen normal size  Lymphatic:  No cervical or inguinal lymphadenopathy  Neurologic:  Patient is awake alert, responding to command, well-oriented to time and place and person moving   Extremities no edema    LABORATORY RESULTS:        CBC with diff:   Results from last 7 days   Lab Units 05/13/23  0534 05/12/23  0543 05/11/23  0454 05/10/23  0503 05/07/23  1506   WBC Thousand/uL 12 68* 9 47 9 54 10 24* 7 73   HEMOGLOBIN g/dL 15 5 15 3 14 3 14 8 14 6   HEMATOCRIT % 48 6 46 8 44 0 45 0 44 1   MCV fL 89 88 88 88 88   PLATELETS Thousands/uL 409* 415* 380 368 345   MCH pg 28 5 28 7 28 4 28 8 29 0   MCHC g/dL 31 9 32 7 32 5 32 9 33 1   RDW % 15 4* 15 7* 15 8* 15 6* 15 7*   MPV fL 8 5* 8 8* 8 8* 8 5* 8 9   NRBC AUTO /100 WBCs  --   --   --  0 0       CMP:  Results from last 7 days   Lab Units 05/13/23  0534 05/12/23  0543 05/11/23  0454 05/10/23  0503   POTASSIUM mmol/L 4 2 3 8 3 9 3 5   CHLORIDE mmol/L 100 100 103 101   CO2 mmol/L 31 30 27 25   BUN mg/dL 20 18 12 15   CREATININE mg/dL 0 86 0 81 0 72 0 91   CALCIUM mg/dL 9 2 9 7 9 1 9 4   AST U/L  --   --   --  60*   ALT U/L  --   --   --  64*   ALK PHOS U/L  --   --   --  64   EGFR ml/min/1 73sq m 104 107 112 101       BMP:  Results from last 7 days   Lab Units 05/13/23  0534 05/12/23  0543 05/11/23  0454   POTASSIUM mmol/L 4 2 3 8 3 9   CHLORIDE mmol/L 100 100 103   CO2 mmol/L 31 30 27   BUN mg/dL 20 18 12   CREATININE mg/dL 0 86 0 81 0 72   CALCIUM mg/dL 9 2 9 7 9 1                        Results from last 7 days   Lab Units 05/10/23  0503   INR  1 03       Lipid Profile:   No results found for: CHOL  Lab Results   Component Value Date    HDL 39 (L) 05/12/2023     Lab Results   Component Value Date    LDLCALC 77 05/12/2023     Lab Results   Component Value Date    TRIG 149 05/12/2023       Cardiac testing:  No results found for this or any previous visit  No results found for this or any previous visit      No "results found for this or any previous visit  No valid procedures specified  No results found for this or any previous visit  Meds/Allergies   all current active meds have been reviewed  Medications Prior to Admission   Medication    [] azithromycin (Zithromax Z-Kingsley) 250 mg tablet    carBAMazepine (TEGretol XR) 400 mg 12 hr tablet    cariprazine (VRAYLAR) 4 5 MG capsule    hydrOXYzine HCL (ATARAX) 25 mg tablet    ipratropium-albuterol (DUO-NEB) 0 5-2 5 mg/3 mL nebulizer solution    levETIRAcetam (KEPPRA) 500 mg tablet    mirtazapine (REMERON) 15 mg tablet    predniSONE 50 mg tablet          ASSESSMENT & PLAN   Principal Problem:    Acute respiratory failure (HCC)  Active Problems:    Asthma    Bipolar 1 disorder (HCC)    Elevated troponin -nonischemic myocardial injury  Mitral regurgitation    Tobacco abuse    Morbid obesity with BMI of 45 0-49 9, adult (HCC)  Acute diastolic heart failure    Patient had elevated troponins which is likely secondary to nonischemic myocardial injury from asthma exacerbation  Cardiac catheterization did not reveal any significant CAD  Patient also has history of asthma and bipolar disorder  Patient was not on bipolar medication at the time of admission  They have been restarted  Since the patient is going to be hospitalized over the weekend, we will schedule transesophageal echo on Monday to evaluate mitral regurgitation further  Risks and benefits have been discussed at length with patient  Patient wishes to proceed  Suhas Tovar MD  2023,11:26 AM    Portions of the record may have been created with voice recognition software  Occasional wrong word or \"sound a like\" substitutions may have occurred due to the inherent limitations of voice recognition software  Read the chart carefully and recognize, using context, where substitutions have occurred    "

## 2023-05-15 LAB
BACTERIA BLD CULT: NORMAL
BACTERIA BLD CULT: NORMAL
DME PARACHUTE DELIVERY DATE ACTUAL: NORMAL
DME PARACHUTE DELIVERY DATE EXPECTED: NORMAL
DME PARACHUTE DELIVERY DATE REQUESTED: NORMAL
DME PARACHUTE ITEM DESCRIPTION: NORMAL
DME PARACHUTE ORDER STATUS: NORMAL
DME PARACHUTE SUPPLIER NAME: NORMAL
DME PARACHUTE SUPPLIER PHONE: NORMAL

## 2023-05-18 ENCOUNTER — OFFICE VISIT (OUTPATIENT)
Dept: CARDIOLOGY CLINIC | Facility: CLINIC | Age: 48
End: 2023-05-18

## 2023-05-18 ENCOUNTER — TELEPHONE (OUTPATIENT)
Dept: CARDIOLOGY CLINIC | Facility: CLINIC | Age: 48
End: 2023-05-18

## 2023-05-18 VITALS
HEIGHT: 66 IN | DIASTOLIC BLOOD PRESSURE: 90 MMHG | WEIGHT: 293 LBS | RESPIRATION RATE: 16 BRPM | OXYGEN SATURATION: 98 % | SYSTOLIC BLOOD PRESSURE: 148 MMHG | HEART RATE: 78 BPM | BODY MASS INDEX: 47.09 KG/M2

## 2023-05-18 DIAGNOSIS — I34.0 NONRHEUMATIC MITRAL VALVE REGURGITATION: ICD-10-CM

## 2023-05-18 DIAGNOSIS — E66.01 MORBID OBESITY WITH BMI OF 45.0-49.9, ADULT (HCC): ICD-10-CM

## 2023-05-18 DIAGNOSIS — I50.32 CHRONIC DIASTOLIC (CONGESTIVE) HEART FAILURE (HCC): ICD-10-CM

## 2023-05-18 DIAGNOSIS — Z72.0 TOBACCO ABUSE: Primary | ICD-10-CM

## 2023-05-18 PROBLEM — I50.9 ACUTE ON CHRONIC CONGESTIVE HEART FAILURE, UNSPECIFIED HEART FAILURE TYPE (HCC): Status: RESOLVED | Noted: 2023-05-13 | Resolved: 2023-05-18

## 2023-05-18 PROBLEM — R07.9 CHEST PAIN: Status: RESOLVED | Noted: 2023-05-10 | Resolved: 2023-05-18

## 2023-05-18 NOTE — H&P (VIEW-ONLY)
[facility] [address] [address]  Cardiology Follow Up    Hugh Armstrong  1975  [id number]      1  Tobacco abuse        2  Chronic diastolic (congestive) heart failure [facility])  Ambulatory referral to Cardiology      3  Nonrheumatic mitral valve regurgitation        4  Morbid obesity with BMI of 45 0-49 9, adult [facility])            Chief Complaint   Patient presents with   • Follow-up       Interval History: This patient presents for follow-up visit after recent hospitalization  Patient has multiple medical problems including bipolar disorder, history of seizure disorder as well as morbid obesity  Patient was admitted with shortness of breath and chest pain  Patient had elevated troponins and subsequently had cardiac catheterization which did not reveal any significant CAD  Patient was also found to have mitral regurgitation which is at least moderate with possible restriction of the posterior leaflet and mild stenosis  Options of transesophageal echo were presented to the patient once her asthma exacerbation was treated  Patient is here for follow-up visit  Patient states that her shortness of breath is improved  She unfortunately continues to smoke in spite of repeated counseling  She states that she has been compliant all her present medications  Patient had 's of hypoxia and has been on oxygen at home      Patient Active Problem List   Diagnosis   • Seizure ([facility] )   • Asthma   • HTN (hypertension)   • Bipolar 1 disorder ([facility] )   • Elevated troponin   • Tobacco abuse   • Morbid obesity with BMI of 45 0-49 9, adult (HCC)   • Acute exacerbation of congestive heart failure ([facility] )     Past Medical History:   Diagnosis Date   • Asthma    • Seizure [facility])      Social History     Socioeconomic History   • Marital status: /Civil Union     Spouse name: Not on file   • Number of children: Not on file   • Years of education: Not on file   • Highest education level: Not on file   Occupational History   • Not on file   Tobacco Use   • Smoking status: Every Day     Types: Cigarettes   • Smokeless tobacco: Never   Vaping Use   • Vaping Use: Every day   Substance and Sexual Activity   • Alcohol use: Never   • Drug use: Never   • Sexual activity: Not on file   Other Topics Concern   • Not on file   Social History Narrative   • Not on file     Social Determinants of Health     Financial Resource Strain: High Risk   • Difficulty of Paying Living Expenses: Hard   Food Insecurity: Food Insecurity Present   • Worried About [address] in the Last Year: Often true   • Ran Out of Food in the Last Year: Often true   Transportation Needs: Unmet Transportation Needs   • Lack of Transportation (Medical): Yes   • Lack of Transportation (Non-Medical): No   Physical Activity: Not on file   Stress: Not on file   Social Connections: Not on file   Intimate Partner Violence: Not on file   Housing Stability: High Risk   • Unable to Pay for Housing in the Last Year: No   • Number of Places Lived in the Last Year: 1   • Unstable Housing in the Last Year: Yes      History reviewed  No pertinent family history  Past Surgical History:   Procedure Laterality Date   • CARDIAC CATHETERIZATION N/A 5/11/2023    Procedure: Cardiac catheterization;  Surgeon: Minerva Davis MD;  Location: [address] [place] [address] CATH LAB; Service: Cardiology   • CARDIAC CATHETERIZATION N/A 5/11/2023    Procedure: Cardiac Coronary Angiogram;  Surgeon: Minerva Davis MD;  Location: [address] [place] [address] CATH LAB;   Service: Cardiology       Current Outpatient Medications:   •  aspirin 81 mg chewable tablet, Chew 1 tablet (81 mg total) daily Do not start before May 14, 2023 , Disp: 30 tablet, Rfl: 0  •  carBAMazepine (TEGretol XR) 400 mg 12 hr tablet, Take 400 mg by mouth 2 (two) times a day, Disp: , Rfl:   •  cariprazine (VRAYLAR) 4 5 MG capsule, Take 4 5 mg by mouth daily, Disp: , Rfl:   •  Cholecalciferol 50 MCG (2000 UT) CAPS, Take 2,000 Units by mouth daily, Disp: , Rfl:   •  diazepam (VALIUM) 10 mg tablet, Take 1 tablet (10 mg total) by mouth daily at bedtime for 10 days, Disp: 10 tablet, Rfl: 0  •  furosemide (LASIX) 40 mg tablet, Take 1 tablet (40 mg total) by mouth daily Do not start before May 14, 2023 , Disp: 30 tablet, Rfl: 0  •  hydrOXYzine HCL (ATARAX) 10 mg tablet, Take 1 tablet (10 mg total) by mouth every 12 (twelve) hours as needed for anxiety, Disp: 30 tablet, Rfl: 0  •  ipratropium-albuterol (DUO-NEB) 0 5-2 5 mg/3 mL nebulizer solution, Take 3 mL by nebulization 4 (four) times a day, Disp: 60 mL, Rfl: 1  •  levETIRAcetam (KEPPRA) 500 mg tablet, Take 500 mg by mouth every 12 (twelve) hours, Disp: , Rfl:   •  mirtazapine (REMERON) 15 mg tablet, Take 15 mg by mouth daily at bedtime, Disp: , Rfl:   •  OLANZapine (ZyPREXA ZYDIS) 5 mg dispersible tablet, Take 1 tablet (5 mg total) by mouth daily Do not start before May 14, 2023 , Disp: 30 tablet, Rfl: 0  •  prazosin (MINIPRESS) 2 mg capsule, Take 1 capsule (2 mg total) by mouth daily Do not start before May 14, 2023 , Disp: 30 capsule, Rfl: 0  •  predniSONE 10 mg tablet, Take 4 tablets (40 mg total) by mouth daily for 3 days, THEN 3 tablets (30 mg total) daily for 3 days, THEN 2 tablets (20 mg total) daily for 3 days, THEN 1 tablet (10 mg total) daily for 3 days  , Disp: 30 tablet, Rfl: 0  Allergies   Allergen Reactions   • Latex Rash   • Amlodipine Swelling       Labs:  No results displayed because visit has over 200 results        Admission on 05/07/2023, Discharged on 05/07/2023   Component Date Value   • Ventricular Rate 05/07/2023 93    • Atrial Rate 05/07/2023 93    • [place] Interval 05/07/2023 140    • QRSD Interval 05/07/2023 76    • QT Interval 05/07/2023 348    • QTC Interval 05/07/2023 432    • P Axis 05/07/2023 69    • QRS Axis 05/07/2023 59    • T Wave Axis 05/07/2023 36    • WBC 05/07/2023 7 73    • RBC 05/07/2023 5 03    • Hemoglobin 05/07/2023 14 6    • Hematocrit 05/07/2023 44 1    • MCV 05/07/2023 88    • MCH 05/07/2023 29 0    • MCHC 05/07/2023 33 1    • RDW 05/07/2023 15 7 (H)    • MPV 05/07/2023 8 9    • Platelets 61/30/8722 345    • nRBC 05/07/2023 0    • Neutrophils Relative 05/07/2023 75    • Immat GRANS % 05/07/2023 0    • Lymphocytes Relative 05/07/2023 13 (L)    • Monocytes Relative 05/07/2023 10    • Eosinophils Relative 05/07/2023 1    • Basophils Relative 05/07/2023 1    • Neutrophils Absolute 05/07/2023 5 81    • Immature Grans Absolute 05/07/2023 0 03    • Lymphocytes Absolute 05/07/2023 1 00    • Monocytes Absolute 05/07/2023 0 76    • Eosinophils Absolute 05/07/2023 0 08    • Basophils Absolute 05/07/2023 0 05    • Sodium 05/07/2023 135    • Potassium 05/07/2023 3 8    • Chloride 05/07/2023 103    • CO2 05/07/2023 24    • ANION GAP 05/07/2023 8    • BUN 05/07/2023 8    • Creatinine 05/07/2023 0 80    • Glucose 05/07/2023 95    • Calcium 05/07/2023 9 4    • eGFR 05/07/2023 107    • hs TnI 0hr 05/07/2023 5      Imaging: XR chest 1 view portable    Result Date: 5/10/2023  Narrative: CHEST INDICATION:   sob  COMPARISON: 5/7/2023 EXAM PERFORMED/VIEWS:  XR CHEST PORTABLE Single view FINDINGS: Persistent suspicion of mild vascular congestion Cardiomediastinal silhouette appears unremarkable  The lungs are clear  No pneumothorax or pleural effusion  Osseous structures appear within normal limits for patient age  Impression: Mild vascular congestion Similar to previous Workstation performed: [id number]     XR chest 1 view portable    Result Date: 5/8/2023  Narrative: CHEST INDICATION:   sob, wheezing  COMPARISON:  None EXAM PERFORMED/VIEWS:  XR CHEST PORTABLE Single view FINDINGS: Mild vascular congestion Cardiomediastinal silhouette appears unremarkable  The lungs are otherwise clear  No pneumothorax or pleural effusion  Osseous structures appear within normal limits for patient age       Impression: Mild vascular congestion Workstation performed: [id number]     Cardiac catheterization    Result Date: 5/11/2023  Narrative: •  No significant obstructive epicardial coronary artery disease •  Right radial access  Hemostasis achieved with manual pressure  No significant obstructive epicardial coronary artery disease     CTA ED chest PE Study    Result Date: 5/10/2023  Narrative: CTA - CHEST WITH IV CONTRAST - PULMONARY ANGIOGRAM INDICATION:   sob / hypoxic with +troponin  COMPARISON: Chest x-ray dated 5/10/2023 and 5/7/2020 2320  TECHNIQUE: CTA examination of the chest was performed using angiographic technique according to a protocol specifically tailored to evaluate for pulmonary embolism  Multiplanar 2D reformatted images were created from the source data  In addition, coronal 3D MIP postprocessing was performed on the acquisition scanner  Radiation dose length product (DLP) for this visit:  527 mGy-cm   This examination, like all CT scans performed in the [facility], was performed utilizing techniques to minimize radiation dose exposure, including the use of iterative reconstruction and automated exposure control  IV Contrast:  100 mL of iohexol (OMNIPAQUE) FINDINGS: PULMONARY ARTERIAL TREE:  No pulmonary embolus is seen  LUNGS: Reticulonodular opacities noted in the bilateral lungs, most prominently in the bilateral lower lobes, the inferior aspect of the right upper lobe, and in the right middle lobe  Patchy alveolar opacities are seen in the right lower lobe, suspicious for multifocal infection in the appropriate clinical setting  Some scattered air trapping is seen in the lungs which correlates with the patient's history of asthma/reactive airway disease  PLEURA:  Unremarkable  HEART/GREAT VESSELS:  Unremarkable for patient's age  No thoracic aortic aneurysm  MEDIASTINUM AND NETO: Shotty mediastinal and hilar lymph nodes, nonspecific, possibly reactive  CHEST WALL AND LOWER NECK:   Unremarkable   VISUALIZED STRUCTURES IN THE UPPER ABDOMEN:  Unremarkable  OSSEOUS STRUCTURES:  No acute fracture or destructive osseous lesion  Impression: No pulmonary embolism is seen  Reticulonodular opacities noted in the bilateral lungs, most prominently in the bilateral lower lobes, the inferior aspect of the right upper lobe, and in the right middle lobe  Patchy alveolar opacities are seen in the right lower lobe; suspicious for multifocal infection in the appropriate clinical setting  Correlation with the patient's symptoms and laboratory values recommended  Shotty mediastinal and hilar lymph nodes, nonspecific, possibly reactive  Other findings as above  Workstation performed: [id number]     Echo complete w/ contrast if indicated    Result Date: 5/10/2023  Narrative: •  Left Ventricle: Left ventricular cavity size is normal  Wall thickness is normal  The left ventricular ejection fraction is 60%  Systolic function is normal  Wall motion is normal  Diastolic function is normal  •  Left Atrium: The atrium is mildly dilated  •  Right Atrium: The atrium is mildly dilated  •  Mitral Valve: There is annular calcification  There is at least moderate regurgitation  Possible restriction of the posterior leaflet  There is mild stenosis  Consider further evaluation with ZIYAD  •  IVC/SVC: The inferior vena cava is dilated         Review of Systems:  Review of Systems REVIEW OF SYSTEMS:  Constitutional:  Denies fever or chills   Eyes:  Denies change in visual acuity   HENT:  Denies nasal congestion or sore throat   Respiratory:  shortness of breath   Cardiovascular:  edema   GI:  Denies abdominal pain, nausea, vomiting, bloody stools or diarrhea   :  Denies dysuria, frequency, difficulty in micturition and nocturia  Musculoskeletal:  Denies back pain or joint pain   Neurologic:  Denies headache, focal weakness or sensory changes   Endocrine:  Denies polyuria or polydipsia   Lymphatic:  Denies swollen glands   Psychiatric: Bipolar disorder      Physical Exam:    BP "148/90 (BP Location: Left arm, Patient Position: Sitting, Cuff Size: Standard)   Pulse 78   Resp 16   Ht 5' 6\" (1 676 m)   Wt 133 kg (294 lb)   SpO2 98%   BMI 47 45 kg/m²     Physical Exam   PHYSICAL EXAM:  General:  Patient is not in acute distress   Head: Normocephalic, Atraumatic  HEENT:  Both pupils normal-size atraumatic, normocephalic, nonicteric  Neck:  JVP not raised  Trachea central  No carotid bruit  Respiratory: Scattered occasional rhonchi  Cardiovascular:  Regular rate and rhythm no S3 2/6 systolic murmur in mitral area  GI:  Abdomen soft nontender  No organomegaly  Lymphatic:  No cervical or inguinal lymphadenopathy  Neurologic:  Patient is awake alert, oriented   Grossly nonfocal  Extremities 1+ edema    Discussion/Summary: Patient with multiple medical problems who seems to be doing reasonably well from cardiac standpoint  Previous studies reviewed with patient  Medications reviewed and possible side effects discussed  concepts of cardiovascular disease , signs and symptoms of heart disease  Dietary and risk factor modification reinforced  All questions answered  Safety measures reviewed  Patient advised to report any problems prompting medical attention  Importance of salt restriction and compliance with diuretics discussed with patient  Patient is on a tapering dose of steroids for respiratory insufficiency and asthma exacerbation  Patient will be scheduled for transesophageal echo in the next 2 to 3 weeks when she is better from a pulmonary standpoint    Risks and benefits of transesophageal ECHO including but not limited to aspiration, damage to the esophagus, complications related to anesthesia were discussed with the patient  Patient understands the same and wishes to proceed    Follow-up in 3 to 4 months or earlier as needed  Patient is agreeable with the plan of care      "

## 2023-05-18 NOTE — PROGRESS NOTES
[facility] [address] [address]  Cardiology Follow Up    Dusty Samuels  1975  [id number]      1  Tobacco abuse        2  Chronic diastolic (congestive) heart failure [facility])  Ambulatory referral to Cardiology      3  Nonrheumatic mitral valve regurgitation        4  Morbid obesity with BMI of 45 0-49 9, adult [facility])            Chief Complaint   Patient presents with   • Follow-up       Interval History: This patient presents for follow-up visit after recent hospitalization  Patient has multiple medical problems including bipolar disorder, history of seizure disorder as well as morbid obesity  Patient was admitted with shortness of breath and chest pain  Patient had elevated troponins and subsequently had cardiac catheterization which did not reveal any significant CAD  Patient was also found to have mitral regurgitation which is at least moderate with possible restriction of the posterior leaflet and mild stenosis  Options of transesophageal echo were presented to the patient once her asthma exacerbation was treated  Patient is here for follow-up visit  Patient states that her shortness of breath is improved  She unfortunately continues to smoke in spite of repeated counseling  She states that she has been compliant all her present medications  Patient had 's of hypoxia and has been on oxygen at home      Patient Active Problem List   Diagnosis   • Seizure ([facility] [address] )   • Asthma   • HTN (hypertension)   • Bipolar 1 disorder ([facility] [address] )   • Elevated troponin   • Tobacco abuse   • Morbid obesity with BMI of 45 0-49 9, adult (HCC)   • Acute exacerbation of congestive heart failure ([facility] [address] )     Past Medical History:   Diagnosis Date   • Asthma    • Seizure [facility])      Social History     Socioeconomic History   • Marital status: /Civil Union     Spouse name: Not on file   • Number of children: Not on file   • Years of education: Not on file   • Highest education level: Not on file   Occupational History   • Not on file   Tobacco Use   • Smoking status: Every Day     Types: Cigarettes   • Smokeless tobacco: Never   Vaping Use   • Vaping Use: Every day   Substance and Sexual Activity   • Alcohol use: Never   • Drug use: Never   • Sexual activity: Not on file   Other Topics Concern   • Not on file   Social History Narrative   • Not on file     Social Determinants of Health     Financial Resource Strain: High Risk   • Difficulty of Paying Living Expenses: Hard   Food Insecurity: Food Insecurity Present   • Worried About [address] in the Last Year: Often true   • Ran Out of Food in the Last Year: Often true   Transportation Needs: Unmet Transportation Needs   • Lack of Transportation (Medical): Yes   • Lack of Transportation (Non-Medical): No   Physical Activity: Not on file   Stress: Not on file   Social Connections: Not on file   Intimate Partner Violence: Not on file   Housing Stability: High Risk   • Unable to Pay for Housing in the Last Year: No   • Number of Places Lived in the Last Year: 1   • Unstable Housing in the Last Year: Yes      History reviewed  No pertinent family history  Past Surgical History:   Procedure Laterality Date   • CARDIAC CATHETERIZATION N/A 5/11/2023    Procedure: Cardiac catheterization;  Surgeon: Anne Arnold MD;  Location: [address] [place] [address] CATH LAB; Service: Cardiology   • CARDIAC CATHETERIZATION N/A 5/11/2023    Procedure: Cardiac Coronary Angiogram;  Surgeon: Anne Arnold MD;  Location: [address] [place] [address] CATH LAB;   Service: Cardiology       Current Outpatient Medications:   •  aspirin 81 mg chewable tablet, Chew 1 tablet (81 mg total) daily Do not start before May 14, 2023 , Disp: 30 tablet, Rfl: 0  •  carBAMazepine (TEGretol XR) 400 mg 12 hr tablet, Take 400 mg by mouth 2 (two) times a day, Disp: , Rfl:   •  cariprazine (VRAYLAR) 4 5 MG capsule, Take 4 5 mg by mouth daily, Disp: , Rfl:   •  Cholecalciferol 50 MCG (2000 UT) CAPS, Take 2,000 Units by mouth daily, Disp: , Rfl:   •  diazepam (VALIUM) 10 mg tablet, Take 1 tablet (10 mg total) by mouth daily at bedtime for 10 days, Disp: 10 tablet, Rfl: 0  •  furosemide (LASIX) 40 mg tablet, Take 1 tablet (40 mg total) by mouth daily Do not start before May 14, 2023 , Disp: 30 tablet, Rfl: 0  •  hydrOXYzine HCL (ATARAX) 10 mg tablet, Take 1 tablet (10 mg total) by mouth every 12 (twelve) hours as needed for anxiety, Disp: 30 tablet, Rfl: 0  •  ipratropium-albuterol (DUO-NEB) 0 5-2 5 mg/3 mL nebulizer solution, Take 3 mL by nebulization 4 (four) times a day, Disp: 60 mL, Rfl: 1  •  levETIRAcetam (KEPPRA) 500 mg tablet, Take 500 mg by mouth every 12 (twelve) hours, Disp: , Rfl:   •  mirtazapine (REMERON) 15 mg tablet, Take 15 mg by mouth daily at bedtime, Disp: , Rfl:   •  OLANZapine (ZyPREXA ZYDIS) 5 mg dispersible tablet, Take 1 tablet (5 mg total) by mouth daily Do not start before May 14, 2023 , Disp: 30 tablet, Rfl: 0  •  prazosin (MINIPRESS) 2 mg capsule, Take 1 capsule (2 mg total) by mouth daily Do not start before May 14, 2023 , Disp: 30 capsule, Rfl: 0  •  predniSONE 10 mg tablet, Take 4 tablets (40 mg total) by mouth daily for 3 days, THEN 3 tablets (30 mg total) daily for 3 days, THEN 2 tablets (20 mg total) daily for 3 days, THEN 1 tablet (10 mg total) daily for 3 days  , Disp: 30 tablet, Rfl: 0  Allergies   Allergen Reactions   • Latex Rash   • Amlodipine Swelling       Labs:  No results displayed because visit has over 200 results        Admission on 05/07/2023, Discharged on 05/07/2023   Component Date Value   • Ventricular Rate 05/07/2023 93    • Atrial Rate 05/07/2023 93    • HI Interval 05/07/2023 140    • QRSD Interval 05/07/2023 76    • QT Interval 05/07/2023 348    • QTC Interval 05/07/2023 432    • P Axis 05/07/2023 69    • QRS Axis 05/07/2023 59    • T Wave Axis 05/07/2023 36    • WBC 05/07/2023 7 73    • RBC 05/07/2023 5 03    • Hemoglobin 05/07/2023 14 6    • Hematocrit 05/07/2023 44 1    • MCV 05/07/2023 88    • MCH 05/07/2023 29 0    • MCHC 05/07/2023 33 1    • RDW 05/07/2023 15 7 (H)    • MPV 05/07/2023 8 9    • Platelets 41/72/9586 345    • nRBC 05/07/2023 0    • Neutrophils Relative 05/07/2023 75    • Immat GRANS % 05/07/2023 0    • Lymphocytes Relative 05/07/2023 13 (L)    • Monocytes Relative 05/07/2023 10    • Eosinophils Relative 05/07/2023 1    • Basophils Relative 05/07/2023 1    • Neutrophils Absolute 05/07/2023 5 81    • Immature Grans Absolute 05/07/2023 0 03    • Lymphocytes Absolute 05/07/2023 1 00    • Monocytes Absolute 05/07/2023 0 76    • Eosinophils Absolute 05/07/2023 0 08    • Basophils Absolute 05/07/2023 0 05    • Sodium 05/07/2023 135    • Potassium 05/07/2023 3 8    • Chloride 05/07/2023 103    • CO2 05/07/2023 24    • ANION GAP 05/07/2023 8    • BUN 05/07/2023 8    • Creatinine 05/07/2023 0 80    • Glucose 05/07/2023 95    • Calcium 05/07/2023 9 4    • eGFR 05/07/2023 107    • hs TnI 0hr 05/07/2023 5      Imaging: XR chest 1 view portable    Result Date: 5/10/2023  Narrative: CHEST INDICATION:   sob  COMPARISON: 5/7/2023 EXAM PERFORMED/VIEWS:  XR CHEST PORTABLE Single view FINDINGS: Persistent suspicion of mild vascular congestion Cardiomediastinal silhouette appears unremarkable  The lungs are clear  No pneumothorax or pleural effusion  Osseous structures appear within normal limits for patient age  Impression: Mild vascular congestion Similar to previous Workstation performed: [id number]     XR chest 1 view portable    Result Date: 5/8/2023  Narrative: CHEST INDICATION:   sob, wheezing  COMPARISON:  None EXAM PERFORMED/VIEWS:  XR CHEST PORTABLE Single view FINDINGS: Mild vascular congestion Cardiomediastinal silhouette appears unremarkable  The lungs are otherwise clear  No pneumothorax or pleural effusion  Osseous structures appear within normal limits for patient age       Impression: Mild vascular congestion Workstation performed: [id number]     Cardiac catheterization    Result Date: 5/11/2023  Narrative: •  No significant obstructive epicardial coronary artery disease •  Right radial access  Hemostasis achieved with manual pressure  No significant obstructive epicardial coronary artery disease     CTA ED chest PE Study    Result Date: 5/10/2023  Narrative: CTA - CHEST WITH IV CONTRAST - PULMONARY ANGIOGRAM INDICATION:   sob / hypoxic with +troponin  COMPARISON: Chest x-ray dated 5/10/2023 and 5/7/2020 3560  TECHNIQUE: CTA examination of the chest was performed using angiographic technique according to a protocol specifically tailored to evaluate for pulmonary embolism  Multiplanar 2D reformatted images were created from the source data  In addition, coronal 3D MIP postprocessing was performed on the acquisition scanner  Radiation dose length product (DLP) for this visit:  527 mGy-cm   This examination, like all CT scans performed in the [facility], was performed utilizing techniques to minimize radiation dose exposure, including the use of iterative reconstruction and automated exposure control  IV Contrast:  100 mL of iohexol (OMNIPAQUE) FINDINGS: PULMONARY ARTERIAL TREE:  No pulmonary embolus is seen  LUNGS: Reticulonodular opacities noted in the bilateral lungs, most prominently in the bilateral lower lobes, the inferior aspect of the right upper lobe, and in the right middle lobe  Patchy alveolar opacities are seen in the right lower lobe, suspicious for multifocal infection in the appropriate clinical setting  Some scattered air trapping is seen in the lungs which correlates with the patient's history of asthma/reactive airway disease  PLEURA:  Unremarkable  HEART/GREAT VESSELS:  Unremarkable for patient's age  No thoracic aortic aneurysm  MEDIASTINUM AND NETO: Shotty mediastinal and hilar lymph nodes, nonspecific, possibly reactive  CHEST WALL AND LOWER NECK:   Unremarkable   VISUALIZED STRUCTURES IN THE UPPER ABDOMEN:  Unremarkable  OSSEOUS STRUCTURES:  No acute fracture or destructive osseous lesion  Impression: No pulmonary embolism is seen  Reticulonodular opacities noted in the bilateral lungs, most prominently in the bilateral lower lobes, the inferior aspect of the right upper lobe, and in the right middle lobe  Patchy alveolar opacities are seen in the right lower lobe; suspicious for multifocal infection in the appropriate clinical setting  Correlation with the patient's symptoms and laboratory values recommended  Shotty mediastinal and hilar lymph nodes, nonspecific, possibly reactive  Other findings as above  Workstation performed: [id number]     Echo complete w/ contrast if indicated    Result Date: 5/10/2023  Narrative: •  Left Ventricle: Left ventricular cavity size is normal  Wall thickness is normal  The left ventricular ejection fraction is 60%  Systolic function is normal  Wall motion is normal  Diastolic function is normal  •  Left Atrium: The atrium is mildly dilated  •  Right Atrium: The atrium is mildly dilated  •  Mitral Valve: There is annular calcification  There is at least moderate regurgitation  Possible restriction of the posterior leaflet  There is mild stenosis  Consider further evaluation with ZIYAD  •  IVC/SVC: The inferior vena cava is dilated         Review of Systems:  Review of Systems REVIEW OF SYSTEMS:  Constitutional:  Denies fever or chills   Eyes:  Denies change in visual acuity   HENT:  Denies nasal congestion or sore throat   Respiratory:  shortness of breath   Cardiovascular:  edema   GI:  Denies abdominal pain, nausea, vomiting, bloody stools or diarrhea   :  Denies dysuria, frequency, difficulty in micturition and nocturia  Musculoskeletal:  Denies back pain or joint pain   Neurologic:  Denies headache, focal weakness or sensory changes   Endocrine:  Denies polyuria or polydipsia   Lymphatic:  Denies swollen glands   Psychiatric: Bipolar disorder      Physical Exam:    BP "148/90 (BP Location: Left arm, Patient Position: Sitting, Cuff Size: Standard)   Pulse 78   Resp 16   Ht 5' 6\" (1 676 m)   Wt 133 kg (294 lb)   SpO2 98%   BMI 47 45 kg/m²     Physical Exam   PHYSICAL EXAM:  General:  Patient is not in acute distress   Head: Normocephalic, Atraumatic  HEENT:  Both pupils normal-size atraumatic, normocephalic, nonicteric  Neck:  JVP not raised  Trachea central  No carotid bruit  Respiratory: Scattered occasional rhonchi  Cardiovascular:  Regular rate and rhythm no S3 2/6 systolic murmur in mitral area  GI:  Abdomen soft nontender  No organomegaly  Lymphatic:  No cervical or inguinal lymphadenopathy  Neurologic:  Patient is awake alert, oriented   Grossly nonfocal  Extremities 1+ edema    Discussion/Summary: Patient with multiple medical problems who seems to be doing reasonably well from cardiac standpoint  Previous studies reviewed with patient  Medications reviewed and possible side effects discussed  concepts of cardiovascular disease , signs and symptoms of heart disease  Dietary and risk factor modification reinforced  All questions answered  Safety measures reviewed  Patient advised to report any problems prompting medical attention  Importance of salt restriction and compliance with diuretics discussed with patient  Patient is on a tapering dose of steroids for respiratory insufficiency and asthma exacerbation  Patient will be scheduled for transesophageal echo in the next 2 to 3 weeks when she is better from a pulmonary standpoint    Risks and benefits of transesophageal ECHO including but not limited to aspiration, damage to the esophagus, complications related to anesthesia were discussed with the patient  Patient understands the same and wishes to proceed    Follow-up in 3 to 4 months or earlier as needed  Patient is agreeable with the plan of care      "

## 2023-05-18 NOTE — TELEPHONE ENCOUNTER
----- Message from Pool Jerez MD sent at 5/18/2023 10:04 AM EDT -----  Regarding: Schedule ZIYAD at [facility]  Please schedule this patient for transesophageal echo to be done on Monday in 2 to 3 weeks    She will finish up for steroid taper in about 7 to 10 days  I will place the order

## 2023-05-19 ENCOUNTER — TELEPHONE (OUTPATIENT)
Dept: FAMILY MEDICINE CLINIC | Facility: CLINIC | Age: 48
End: 2023-05-19

## 2023-05-24 NOTE — TELEPHONE ENCOUNTER
Leroy Hennessy is not required  I do not see a valid insurance on file  The patient will be contacted by

## 2023-05-25 NOTE — DISCHARGE SUMMARY
JOSE Hernandez  Nurse Practitioner  Hospitalist  Progress Notes     Attested  Date of Service:  5/13/2023  4:39 PM     Attested            Attestation signed by Robbie Kitchen MD at 5/13/2023  6:00 PM     Co-Sign Only (Services provided by AP)     I was the supervising/collaborating physician  I acknowledge the AP's documentation and services provided  I have not seen nor examined the patient independently  I was available, if needed, for consultation        Services provided by the 25 Silvana Langston MD             Expand All Collapse All    [address] [place] [address]  Progress Note  Name: Poly Mar  MRN: [id number]  Unit/Bed#: -01 I Date of Admission: 5/10/2023   Date of Service: 5/13/2023 I Hospital Day: 3        Assessment/Plan   * Acute respiratory failure ([facility] [address] )  Assessment & Plan  • Was 87% O2 RA while in ED  • Currently requiring 2L NC   • Suspected to be in setting of acute asthma exacerbation and heart failure exacerbation  • See tx plants below for asthma and CHF exacerbation   • Wean supplemental O2 as able   ? Overnight pulse ox evaluation reveals that patient's oxygen saturation does drop down into the 70s while sleeping  ? Patient should qualify for night time oxygen use  ? Encouraged patient to follow-up outpatient with pulmonary for sleep apnea evaluation     Acute exacerbation of congestive heart failure [facility])  Assessment & Plan      Wt Readings from Last 3 Encounters:   05/13/23 (!) 138 kg (305 lb 5 4 oz)   05/07/23 (!) 136 kg (300 lb 4 3 oz)      • Acute exacerbation of heart failure with preserved EF  • Patient is experiencing shortness of breath with orthopnea  • Cardiology following  ? Addition to oral Lasix  ? Echocardiogram revealed moderate MR  ?  We will get outpatient ZIYAD     Chest pain  Assessment & Plan  • Patient complaining of left-sided chest pain and indigestion  • JAYDON score of 2  • Troponin peaked at 772  • EKG non-ischemic   • CTA PE study negative for any pulmonary embolism  • Cardiology consulted  ? Patient underwent cardiac catheterization which revealed no significant epicardial coronary artery disease  ? Echocardiogram revealed moderate MR  ? ZIYAD outpatient     Asthma  Assessment & Plan  • Patient with wheezing noted  • Seen in ED on 05/07 w/ asthma exacerbation; improved w/nebs, discharged on PO prednisone and Z-dayana  • Requiring 3L NC at this time  • CXR indicative of vascular congestion  • Respiratory status has improved we will wean IV steroids     Acute on chronic congestive heart failure, unspecified heart failure type [facility])  Assessment & Plan      Wt Readings from Last 3 Encounters:   05/13/23 (!) 138 kg (305 lb 5 4 oz)   05/07/23 (!) 136 kg (300 lb 4 3 oz)                  Morbid obesity with BMI of 45 0-49 9, adult ([facility])  Assessment & Plan  Counseled on lifestyle modifications     Tobacco abuse  Assessment & Plan  • Discussed importance of smoking cessation for greater than 3 minutes  • Nicotine patch ordered     Elevated troponin  Assessment & Plan  • Troponin continuing to uptrend at this time; continue to trend until peak  • EKG non-ischemic   • Reporting chest tightness as above   • CTA PE study negative for any pulmonary embolism  • Cardiology consulted  ? Patient is now euvolemic  ? Change from IV Lasix to p o  Lasix  ? TTE completed revealed moderate MR  ? ZIYAD outpatient     Bipolar 1 disorder ([facility] [address] )  Assessment & Plan  • Hx of Bipolar 1, PTSD, and TRUPTI  • Psych consulted  • Started Zyprexa, Atarax and Prazosin  • Emotional support              Discharging Physician / Practitioner: JOSE Jose  PCP: No primary care provider on file    Admission Date:       Admission Orders (From admission, onward)       Ordered         05/10/23 0739   INPATIENT ADMISSION  Once                         Discharge Date: 05/13/23         Medical Problems      Resolved Problems  Date Reviewed: 5/10/2023   None          Consultations During Hospital Stay:  • IP CONSULT TO CASE MANAGEMENT  • IP CONSULT TO CARDIOLOGY  • IP CONSULT TO PSYCHIATRY  • IP CONSULT TO CASE MANAGEMENT     Procedures Performed:   • XR chest 1 view portable     Result Date: 5/10/2023  Narrative: CHEST INDICATION:   sob  COMPARISON: 5/7/2023 EXAM PERFORMED/VIEWS:  XR CHEST PORTABLE Single view FINDINGS: Persistent suspicion of mild vascular congestion Cardiomediastinal silhouette appears unremarkable  The lungs are clear  No pneumothorax or pleural effusion  Osseous structures appear within normal limits for patient age       Impression: Mild vascular congestion Similar to previous Workstation performed: [id number]      XR chest 1 view portable     Result Date: 5/8/2023  Narrative: CHEST INDICATION:   sob, wheezing  COMPARISON:  None EXAM PERFORMED/VIEWS:  XR CHEST PORTABLE Single view FINDINGS: Mild vascular congestion Cardiomediastinal silhouette appears unremarkable  The lungs are otherwise clear  No pneumothorax or pleural effusion  Osseous structures appear within normal limits for patient age       Impression: Mild vascular congestion Workstation performed: [id number]      Cardiac catheterization     Result Date: 5/11/2023  Narrative: •  No significant obstructive epicardial coronary artery disease •  Right radial access  Hemostasis achieved with manual pressure  No significant obstructive epicardial coronary artery disease      CTA ED chest PE Study     Result Date: 5/10/2023  Narrative: CTA - CHEST WITH IV CONTRAST - PULMONARY ANGIOGRAM INDICATION:   sob / hypoxic with +troponin  COMPARISON: Chest x-ray dated 5/10/2023 and 5/7/2020 2320  TECHNIQUE: CTA examination of the chest was performed using angiographic technique according to a protocol specifically tailored to evaluate for pulmonary embolism  Multiplanar 2D reformatted images were created from the source data   In addition, coronal 3D MIP postprocessing was performed on the acquisition scanner  Radiation dose length product (DLP) for this visit:  527 mGy-cm   This examination, like all CT scans performed in the [facility], was performed utilizing techniques to minimize radiation dose exposure, including the use of iterative reconstruction and automated exposure control  IV Contrast:  100 mL of iohexol (OMNIPAQUE) FINDINGS: PULMONARY ARTERIAL TREE:  No pulmonary embolus is seen  LUNGS: Reticulonodular opacities noted in the bilateral lungs, most prominently in the bilateral lower lobes, the inferior aspect of the right upper lobe, and in the right middle lobe  Patchy alveolar opacities are seen in the right lower lobe, suspicious for multifocal infection in the appropriate clinical setting  Some scattered air trapping is seen in the lungs which correlates with the patient's history of asthma/reactive airway disease  PLEURA:  Unremarkable  HEART/GREAT VESSELS:  Unremarkable for patient's age  No thoracic aortic aneurysm  MEDIASTINUM AND NETO: Shotty mediastinal and hilar lymph nodes, nonspecific, possibly reactive  CHEST WALL AND LOWER NECK:   Unremarkable  VISUALIZED STRUCTURES IN THE UPPER ABDOMEN:  Unremarkable  OSSEOUS STRUCTURES:  No acute fracture or destructive osseous lesion       Impression: No pulmonary embolism is seen  Reticulonodular opacities noted in the bilateral lungs, most prominently in the bilateral lower lobes, the inferior aspect of the right upper lobe, and in the right middle lobe  Patchy alveolar opacities are seen in the right lower lobe; suspicious for multifocal infection in the appropriate clinical setting  Correlation with the patient's symptoms and laboratory values recommended  Shotty mediastinal and hilar lymph nodes, nonspecific, possibly reactive  Other findings as above   Workstation performed: [id number]      Echo complete w/ contrast if indicated     Result Date: 5/10/2023  Narrative: •  Left Ventricle: Left ventricular cavity size is normal  Wall thickness is normal  The left ventricular ejection fraction is 60%  Systolic function is normal  Wall motion is normal  Diastolic function is normal  •  Left Atrium: The atrium is mildly dilated  •  Right Atrium: The atrium is mildly dilated  •  Mitral Valve: There is annular calcification  There is at least moderate regurgitation  Possible restriction of the posterior leaflet  There is mild stenosis  Consider further evaluation with ZIYAD  •  IVC/SVC: The inferior vena cava is dilated  •    •       Significant Findings / Test Results:   • See above     Incidental Findings:   • See above     Test Results Pending at Discharge (will require follow up): • See above     Outpatient Tests Requested:  • See above     Complications: None     Medical History        Past Medical History:   Diagnosis Date   • Asthma     • Seizure ([facility] [address] )              Reason for Admission: Asthma (Pt arrives via EMS c/o an asthma attack starting tonight  Pt recently diagnosed w/ bronchitis  Pt reports sudden onset of wheezing/ SOB  Used rescue inhaler w/ no relief  Given 2 duo nebs from EMS  )        Hospital Course:      Shanique Arita is a 41 y o  male patient with past medical history of see above who originally presented to the hospital on 5/10/2023 due to Asthma (Pt arrives via EMS c/o an asthma attack starting tonight  Pt recently diagnosed w/ bronchitis  Pt reports sudden onset of wheezing/ SOB  Used rescue inhaler w/ no relief  Given 2 duo nebs from EMS )  Patient came in with worsening shortness of breath was found to have a heart failure exacerbation combined with asthma exacerbation and likely undiagnosed underlying COPD as patient has been smoking since she was a teenager  Patient underwent a cardiac cath which was normal   She underwent echocardiogram which revealed moderate mitral valve regurgitation she will need outpatient ZIYAD she understands this and will continue outpatient follow-up    She will need to follow-up "with a psychiatrist regarding her anxiety depression and bipolar disorder, she will need to follow-up with cardiology and get her ZIYAD and she will need to follow-up with a PCP           Please see above list of diagnoses and related plan for additional information       Condition at Discharge: stable      Discharge Day Visit / Exam:      Subjective: Patient understands all of her instructions verbalizes understanding and offers no complaints understands the importance of smoking cessation  Vitals: Blood Pressure: 143/81 (05/13/23 1453)  Pulse: 63 (05/13/23 0720)  Temperature: 97 7 °F (36 5 °C) (05/12/23 2300)  Temp Source: Oral (05/12/23 1513)  Respirations: 19 (05/12/23 1513)  Height: 5' 6\" (167 6 cm) (05/10/23 1524)  Weight - Scale: (!) 138 kg (305 lb 5 4 oz) (05/13/23 0538)  SpO2: 92 % (05/13/23 1358)  Exam:   Physical Exam  Vitals reviewed  Cardiovascular:      Rate and Rhythm: Normal rate  Abdominal:      Palpations: Abdomen is soft  Skin:     General: Skin is warm  Neurological:      Mental Status: He is alert  Mental status is at baseline  Psychiatric:         Mood and Affect: Mood normal             Discussion with Family: wife at bedside     Discharge instructions/Information to patient and family:   See after visit summary for information provided to patient and family        Provisions for Follow-Up Care:  See after visit summary for information related to follow-up care and any pertinent home health orders        Disposition:      Home     Planned Readmission: no     Discharge Statement:  I spent 45 minutes discharging the patient  This time was spent on the day of discharge  I had direct contact with the patient on the day of discharge  Greater than 50% of the total time was spent examining patient, answering all patient questions, arranging and discussing plan of care with patient as well as directly providing post-discharge instructions    Additional time then spent on discharge " activities      Discharge Medications:  See after visit summary for reconciled discharge medications provided to patient and family        ** Please Note: This note has been constructed using a voice recognition system **                 Cosigned by: Lucy Lopez MD at 5/13/2023  6:00 PM     Revision History

## 2023-06-02 ENCOUNTER — TELEPHONE (OUTPATIENT)
Dept: NON INVASIVE DIAGNOSTICS | Facility: HOSPITAL | Age: 48
End: 2023-06-02

## 2023-06-02 RX ORDER — SODIUM CHLORIDE 9 MG/ML
75 INJECTION, SOLUTION INTRAVENOUS CONTINUOUS
Status: CANCELLED | OUTPATIENT
Start: 2023-06-05

## 2023-06-05 ENCOUNTER — ANESTHESIA EVENT (OUTPATIENT)
Dept: NON INVASIVE DIAGNOSTICS | Facility: HOSPITAL | Age: 48
End: 2023-06-05

## 2023-06-05 ENCOUNTER — HOSPITAL ENCOUNTER (OUTPATIENT)
Dept: NON INVASIVE DIAGNOSTICS | Facility: HOSPITAL | Age: 48
Discharge: HOME | End: 2023-06-05
Attending: INTERNAL MEDICINE

## 2023-06-05 ENCOUNTER — ANESTHESIA (OUTPATIENT)
Dept: NON INVASIVE DIAGNOSTICS | Facility: HOSPITAL | Age: 48
End: 2023-06-05

## 2023-06-05 VITALS
TEMPERATURE: 97.5 F | HEART RATE: 62 BPM | RESPIRATION RATE: 20 BRPM | SYSTOLIC BLOOD PRESSURE: 121 MMHG | WEIGHT: 293 LBS | HEIGHT: 65 IN | DIASTOLIC BLOOD PRESSURE: 74 MMHG | BODY MASS INDEX: 48.82 KG/M2 | OXYGEN SATURATION: 95 %

## 2023-06-05 DIAGNOSIS — I34.0 NONRHEUMATIC MITRAL VALVE REGURGITATION: ICD-10-CM

## 2023-06-05 LAB
DOP CALC MV VTI: 42.5 CM
EXT PREGNANCY TEST URINE: NEGATIVE
EXT. CONTROL: NORMAL
MITRAL REGURGITATION PEAK VELOCITY: 4.73 M/S
MITRAL VALVE MEAN INFLOW VELOCITY: 3.74 M/S
MITRAL VALVE REGURGITANT PEAK GRADIENT: 89 MMHG
MV EROA: 0.2 CM2
MV MEAN GRADIENT: 3 MMHG
MV PEAK GRADIENT: 9 MMHG
PISA RADIUS: 0.7 CM
SL CV DOP CALC MV VTI RETROGRADE: 142 CM
SL CV LV EF: 60
SL CV MV MEAN GRADIENT RETROGRADE: 64 MMHG

## 2023-06-05 PROCEDURE — 93312 ECHO TRANSESOPHAGEAL: CPT

## 2023-06-05 PROCEDURE — 81025 URINE PREGNANCY TEST: CPT | Performed by: INTERNAL MEDICINE

## 2023-06-05 PROCEDURE — 93312 ECHO TRANSESOPHAGEAL: CPT | Performed by: INTERNAL MEDICINE

## 2023-06-05 PROCEDURE — 93325 DOPPLER ECHO COLOR FLOW MAPG: CPT | Performed by: INTERNAL MEDICINE

## 2023-06-05 PROCEDURE — 93320 DOPPLER ECHO COMPLETE: CPT | Performed by: INTERNAL MEDICINE

## 2023-06-05 PROCEDURE — 76376 3D RENDER W/INTRP POSTPROCES: CPT

## 2023-06-05 RX ORDER — ALBUTEROL SULFATE 2.5 MG/3ML
2.5 SOLUTION RESPIRATORY (INHALATION) ONCE
Status: COMPLETED | OUTPATIENT
Start: 2023-06-05 | End: 2023-06-05

## 2023-06-05 RX ORDER — PROPOFOL 10 MG/ML
INJECTION, EMULSION INTRAVENOUS AS NEEDED
Status: DISCONTINUED | OUTPATIENT
Start: 2023-06-05 | End: 2023-06-05

## 2023-06-05 RX ORDER — LIDOCAINE HYDROCHLORIDE 20 MG/ML
INJECTION, SOLUTION EPIDURAL; INFILTRATION; INTRACAUDAL; PERINEURAL AS NEEDED
Status: DISCONTINUED | OUTPATIENT
Start: 2023-06-05 | End: 2023-06-05

## 2023-06-05 RX ORDER — SODIUM CHLORIDE 9 MG/ML
75 INJECTION, SOLUTION INTRAVENOUS CONTINUOUS
Status: DISCONTINUED | OUTPATIENT
Start: 2023-06-05 | End: 2023-06-06 | Stop reason: HOSPADM

## 2023-06-05 RX ORDER — SODIUM CHLORIDE, SODIUM LACTATE, POTASSIUM CHLORIDE, CALCIUM CHLORIDE 600; 310; 30; 20 MG/100ML; MG/100ML; MG/100ML; MG/100ML
INJECTION, SOLUTION INTRAVENOUS CONTINUOUS PRN
Status: DISCONTINUED | OUTPATIENT
Start: 2023-06-05 | End: 2023-06-05

## 2023-06-05 RX ORDER — PHENYLEPHRINE HCL IN 0.9% NACL 1 MG/10 ML
SYRINGE (ML) INTRAVENOUS AS NEEDED
Status: DISCONTINUED | OUTPATIENT
Start: 2023-06-05 | End: 2023-06-05

## 2023-06-05 RX ADMIN — PROPOFOL 50 MG: 10 INJECTION, EMULSION INTRAVENOUS at 09:48

## 2023-06-05 RX ADMIN — PROPOFOL 30 MG: 10 INJECTION, EMULSION INTRAVENOUS at 09:54

## 2023-06-05 RX ADMIN — Medication 100 MCG: at 09:50

## 2023-06-05 RX ADMIN — Medication 100 MCG: at 09:54

## 2023-06-05 RX ADMIN — PROPOFOL 100 MG: 10 INJECTION, EMULSION INTRAVENOUS at 09:44

## 2023-06-05 RX ADMIN — SODIUM CHLORIDE, SODIUM LACTATE, POTASSIUM CHLORIDE, AND CALCIUM CHLORIDE: .6; .31; .03; .02 INJECTION, SOLUTION INTRAVENOUS at 07:59

## 2023-06-05 RX ADMIN — PROPOFOL 30 MG: 10 INJECTION, EMULSION INTRAVENOUS at 09:58

## 2023-06-05 RX ADMIN — PROPOFOL 30 MG: 10 INJECTION, EMULSION INTRAVENOUS at 09:51

## 2023-06-05 RX ADMIN — PROPOFOL 30 MG: 10 INJECTION, EMULSION INTRAVENOUS at 10:02

## 2023-06-05 RX ADMIN — ALBUTEROL SULFATE 2.5 MG: 2.5 SOLUTION RESPIRATORY (INHALATION) at 08:35

## 2023-06-05 RX ADMIN — LIDOCAINE HYDROCHLORIDE 100 MG: 20 INJECTION, SOLUTION EPIDURAL; INFILTRATION; INTRACAUDAL; PERINEURAL at 09:44

## 2023-06-05 NOTE — ANESTHESIA PREPROCEDURE EVALUATION
Procedure:  ZIYAD    Relevant Problems   CARDIO   (+) Acute exacerbation of congestive heart failure (HCC)   (+) HTN (hypertension)      NEURO/PSYCH   (+) Seizure (HCC)      PULMONARY   (+) Asthma      Other   (+) Bipolar 1 disorder (HCC)   (+) Morbid obesity with BMI of 45 0-49 9, adult (HCC)   (+) Tobacco abuse        Left Ventricle: Left ventricular cavity size is normal  Wall thickness    is normal  The left ventricular ejection fraction is 60%  Systolic    function is normal  Wall motion is normal  Diastolic function is normal    •  Left Atrium: The atrium is mildly dilated  •  Right Atrium: The atrium is mildly dilated  •  Mitral Valve: There is annular calcification  There is at least    moderate regurgitation  Possible restriction of the posterior leaflet  There is mild stenosis  Consider further evaluation with ZIYAD  •  IVC/SVC: The inferior vena cava is dilated  Physical Exam    Airway    Mallampati score: II  TM Distance: >3 FB  Neck ROM: full     Dental       Cardiovascular  Cardiovascular exam normal    Pulmonary  Pulmonary exam normal     Other Findings        Anesthesia Plan  ASA Score- 3     Anesthesia Type- IV sedation with anesthesia with ASA Monitors  Additional Monitors:   Airway Plan:           Plan Factors-Exercise tolerance (METS): >4 METS  Chart reviewed  EKG reviewed  Imaging results reviewed  Existing labs reviewed  Patient summary reviewed  Induction- intravenous  Postoperative Plan-     Informed Consent- Anesthetic plan and risks discussed with patient  I personally reviewed this patient with the CRNA  Discussed and agreed on the Anesthesia Plan with the CRNA  Zenia Mckeon

## 2023-06-05 NOTE — ANESTHESIA POSTPROCEDURE EVALUATION
Post-Op Assessment Note    CV Status:  Stable  Pain Score: 0    Pain management: adequate     Mental Status:  Alert and awake   Hydration Status:  Stable   PONV Controlled:  None   Airway Patency:  Patent      Post Op Vitals Reviewed: Yes      Staff: CRNA         No notable events documented      BP   118/69   Temp     Pulse  61   Resp   16   SpO2   100

## 2023-06-05 NOTE — INTERVAL H&P NOTE
Update: (This section must be completed if the H&P was completed greater than 24 hrs to procedure or admission)    H&P reviewed  After examining the patient, I find no changed to the H&P since it had been written  Patient re-evaluated   Accept as history and physical     Valeta Denver, MD/June 5, 2023/10:49 AM

## 2023-06-06 ENCOUNTER — TELEPHONE (OUTPATIENT)
Dept: CARDIOLOGY CLINIC | Facility: CLINIC | Age: 48
End: 2023-06-06

## 2023-06-06 NOTE — TELEPHONE ENCOUNTER
----- Message from Mat Habermann, MD sent at 6/6/2023 12:08 PM EDT -----  Please call the patient  Transesophageal echo shows normal ejection fraction with moderate mitral regurgitation and mild mitral stenosis  Overall no major change compared to transthoracic echocardiogram done while she was in the hospital     Patient will need repeat regular transthoracic echocardiogram in 1 year

## 2023-08-23 DIAGNOSIS — I50.9 ACUTE ON CHRONIC CONGESTIVE HEART FAILURE, UNSPECIFIED HEART FAILURE TYPE (HCC): ICD-10-CM

## 2023-08-23 RX ORDER — FUROSEMIDE 40 MG/1
40 TABLET ORAL DAILY
Qty: 90 TABLET | Refills: 3 | Status: SHIPPED | OUTPATIENT
Start: 2023-08-23

## 2023-08-23 NOTE — TELEPHONE ENCOUNTER
Name of [facility]    Call back Number:[phone]    Medication(s):Furosemide   Are we prescribing provider?:    30 or 90 day supply:    Pharmacy name/number:[facility] [place]   Last or Next appt?:

## (undated) DEVICE — GLIDESHEATH SLENDER STAINLESS STEEL KIT: Brand: GLIDESHEATH SLENDER

## (undated) DEVICE — DGW .035 FC J3MM 260CM TEF: Brand: EMERALD

## (undated) DEVICE — RADIFOCUS OPTITORQUE ANGIOGRAPHIC CATHETER: Brand: OPTITORQUE